# Patient Record
Sex: FEMALE | Race: BLACK OR AFRICAN AMERICAN | NOT HISPANIC OR LATINO | Employment: UNEMPLOYED | URBAN - METROPOLITAN AREA
[De-identification: names, ages, dates, MRNs, and addresses within clinical notes are randomized per-mention and may not be internally consistent; named-entity substitution may affect disease eponyms.]

---

## 2017-07-13 ENCOUNTER — HOSPITAL ENCOUNTER (EMERGENCY)
Facility: OTHER | Age: 32
Discharge: HOME | End: 2017-07-13
Attending: EMERGENCY MEDICINE

## 2017-07-13 VITALS
RESPIRATION RATE: 18 BRPM | OXYGEN SATURATION: 100 % | BODY MASS INDEX: 38.98 KG/M2 | SYSTOLIC BLOOD PRESSURE: 159 MMHG | DIASTOLIC BLOOD PRESSURE: 96 MMHG | HEART RATE: 74 BPM | TEMPERATURE: 98 F | WEIGHT: 241.5 LBS

## 2017-07-13 DIAGNOSIS — N10 ACUTE PYELONEPHRITIS: Primary | ICD-10-CM

## 2017-07-13 LAB
ALBUMIN SERPL-MCNC: 3.5 G/DL (ref 3.3–5.5)
ALP SERPL-CCNC: 50 U/L (ref 42–141)
B-HCG UR QL: NEGATIVE
BILIRUB SERPL-MCNC: 0.7 MG/DL (ref 0.2–1.6)
BILIRUBIN, POC UA: NEGATIVE
BLOOD, POC UA: ABNORMAL
BUN SERPL-MCNC: 7 MG/DL (ref 7–22)
CALCIUM SERPL-MCNC: 9 MG/DL (ref 8–10.3)
CHLORIDE SERPL-SCNC: 101 MMOL/L (ref 98–108)
CLARITY, POC UA: ABNORMAL
COLOR, POC UA: ABNORMAL
CREAT SERPL-MCNC: 0.9 MG/DL (ref 0.6–1.2)
CTP QC/QA: YES
GLUCOSE SERPL-MCNC: 88 MG/DL (ref 73–118)
GLUCOSE, POC UA: NEGATIVE
KETONES, POC UA: NEGATIVE
LEUKOCYTE EST, POC UA: ABNORMAL
NITRITE, POC UA: NEGATIVE
PH UR STRIP: 7 [PH]
POC ALT (SGPT): 9 U/L (ref 10–47)
POC AST (SGOT): 19 U/L (ref 11–38)
POC TCO2: 28 MMOL/L (ref 18–33)
POTASSIUM BLD-SCNC: 3.7 MMOL/L (ref 3.6–5.1)
PROTEIN, POC UA: ABNORMAL
PROTEIN, POC: 7.1 G/DL (ref 6.4–8.1)
SODIUM BLD-SCNC: 137 MMOL/L (ref 128–145)
SPECIFIC GRAVITY, POC UA: 1.02
UROBILINOGEN, POC UA: 0.2 E.U./DL

## 2017-07-13 PROCEDURE — 87077 CULTURE AEROBIC IDENTIFY: CPT

## 2017-07-13 PROCEDURE — 96372 THER/PROPH/DIAG INJ SC/IM: CPT

## 2017-07-13 PROCEDURE — 81025 URINE PREGNANCY TEST: CPT

## 2017-07-13 PROCEDURE — 81025 URINE PREGNANCY TEST: CPT | Performed by: EMERGENCY MEDICINE

## 2017-07-13 PROCEDURE — 87186 SC STD MICRODIL/AGAR DIL: CPT

## 2017-07-13 PROCEDURE — 99284 EMERGENCY DEPT VISIT MOD MDM: CPT | Mod: 25

## 2017-07-13 PROCEDURE — 96374 THER/PROPH/DIAG INJ IV PUSH: CPT

## 2017-07-13 PROCEDURE — 87088 URINE BACTERIA CULTURE: CPT

## 2017-07-13 PROCEDURE — 80053 COMPREHEN METABOLIC PANEL: CPT

## 2017-07-13 PROCEDURE — 63600175 PHARM REV CODE 636 W HCPCS: Performed by: EMERGENCY MEDICINE

## 2017-07-13 PROCEDURE — 85025 COMPLETE CBC W/AUTO DIFF WBC: CPT

## 2017-07-13 PROCEDURE — 87086 URINE CULTURE/COLONY COUNT: CPT

## 2017-07-13 RX ORDER — KETOROLAC TROMETHAMINE 30 MG/ML
30 INJECTION, SOLUTION INTRAMUSCULAR; INTRAVENOUS
Status: COMPLETED | OUTPATIENT
Start: 2017-07-13 | End: 2017-07-13

## 2017-07-13 RX ORDER — PHENAZOPYRIDINE HYDROCHLORIDE 200 MG/1
200 TABLET, FILM COATED ORAL 3 TIMES DAILY
Qty: 6 TABLET | Refills: 0 | Status: SHIPPED | OUTPATIENT
Start: 2017-07-13 | End: 2017-07-23

## 2017-07-13 RX ORDER — CIPROFLOXACIN 500 MG/1
500 TABLET ORAL 2 TIMES DAILY
Qty: 20 TABLET | Refills: 0 | Status: SHIPPED | OUTPATIENT
Start: 2017-07-13 | End: 2017-07-27

## 2017-07-13 RX ORDER — IBUPROFEN 800 MG/1
800 TABLET ORAL EVERY 6 HOURS PRN
Qty: 20 TABLET | Refills: 0 | Status: SHIPPED | OUTPATIENT
Start: 2017-07-13 | End: 2018-02-02

## 2017-07-13 RX ORDER — CEFTRIAXONE 1 G/1
1 INJECTION, POWDER, FOR SOLUTION INTRAMUSCULAR; INTRAVENOUS
Status: COMPLETED | OUTPATIENT
Start: 2017-07-13 | End: 2017-07-13

## 2017-07-13 RX ADMIN — KETOROLAC TROMETHAMINE 30 MG: 30 INJECTION, SOLUTION INTRAMUSCULAR at 03:07

## 2017-07-13 RX ADMIN — CEFTRIAXONE SODIUM 1 G: 1 INJECTION, POWDER, FOR SOLUTION INTRAMUSCULAR; INTRAVENOUS at 03:07

## 2017-07-13 NOTE — ED PROVIDER NOTES
Encounter Date: 2017       History     Chief Complaint   Patient presents with    Flank Pain     pain for 3 days, worse tonight     31 y.o. Female with past medical history of diabetes, hypertension presents to the emergency department complaining of acute left flank pain that began 3 days ago.  She reports associated dysuria, gross hematuria once yesterday and urinary frequency since that time.  She states she's been taking ibuprofen with minimal relief.  She states pain is worse with movement and is somewhat improved when she holds on to the area.  She reports similar symptoms when she was diagnosed with a urinary tract infection the past.          Review of patient's allergies indicates:  No Known Allergies  No past medical history on file.  Past Surgical History:   Procedure Laterality Date     SECTION       Family History   Problem Relation Age of Onset    Hypertension Mother     Diabetes Mother     Heart disease Mother     Hypertension Father     Hypertension Sister     Diabetes Brother      Social History   Substance Use Topics    Smoking status: Never Smoker    Smokeless tobacco: Not on file    Alcohol use Yes      Comment: socially     Review of Systems   Constitutional: Positive for fever.   Respiratory: Negative.    Cardiovascular: Negative.    Gastrointestinal: Positive for nausea and vomiting. Negative for abdominal pain, constipation and diarrhea.   Genitourinary: Positive for dysuria, flank pain, frequency and hematuria. Negative for decreased urine volume, difficulty urinating, genital sores, pelvic pain and vaginal discharge.   Musculoskeletal: Positive for back pain. Negative for gait problem.   Neurological: Negative for dizziness and headaches.   All other systems reviewed and are negative.      Physical Exam     Initial Vitals [17 0033]   BP Pulse Resp Temp SpO2   (!) 174/117 74 18 98.3 °F (36.8 °C) 100 %      MAP       136         Physical Exam    Nursing note and  vitals reviewed.  Constitutional: She appears well-developed and well-nourished. She is not diaphoretic. No distress.   HENT:   Head: Normocephalic and atraumatic.   Mouth/Throat: Oropharynx is clear and moist. No oropharyngeal exudate.   Eyes: EOM are normal. Pupils are equal, round, and reactive to light.   Neck: Normal range of motion. Neck supple.   Cardiovascular: Normal rate, regular rhythm and intact distal pulses.   No murmur heard.  Pulmonary/Chest: Breath sounds normal. No stridor. No respiratory distress.   Abdominal: Soft. Bowel sounds are normal. There is no tenderness. There is CVA tenderness (left). There is no rigidity, no rebound, no guarding, no tenderness at McBurney's point and negative Gould's sign.   Musculoskeletal: Normal range of motion. She exhibits no edema or tenderness.   Neurological: She is alert and oriented to person, place, and time. She has normal strength. No cranial nerve deficit.   Skin: Skin is warm and dry. Capillary refill takes less than 2 seconds. No erythema. No pallor.   Psychiatric: She has a normal mood and affect.         ED Course   Procedures  Labs Reviewed   POCT URINALYSIS W/O SCOPE - Abnormal; Notable for the following:        Result Value    Glucose, UA Negative (*)     Bilirubin, UA Negative (*)     Ketones, UA Negative (*)     Blood, UA 2+ (*)     Protein, UA 1+ (*)     Nitrite, UA Negative (*)     Leukocytes, UA 1+ (*)     All other components within normal limits   POCT CMP - Abnormal; Notable for the following:     ALT (SGPT), POC 9 (*)     POC BUN 7 (*)     All other components within normal limits   CULTURE, URINE   POCT URINE PREGNANCY   POCT URINALYSIS W/O SCOPE   POCT CBC   POCT CMP                               ED Course     Labs Reviewed  Admission on 07/13/2017, Discharged on 07/13/2017   Component Date Value Ref Range Status    POC Preg Test, Ur 07/13/2017 Negative  Negative Final     Acceptable 07/13/2017 Yes   Final    Glucose,  UA 07/13/2017 Negative*  Final    Bilirubin, UA 07/13/2017 Negative*  Final    Ketones, UA 07/13/2017 Negative*  Final    Spec Grav UA 07/13/2017 1.020   Final    Blood, UA 07/13/2017 2+*  Final    PH, UA 07/13/2017 7.0   Final    Protein, UA 07/13/2017 1+*  Final    Urobilinogen, UA 07/13/2017 0.2  E.U./dL Final    Nitrite, UA 07/13/2017 Negative*  Final    Leukocytes, UA 07/13/2017 1+*  Final    Color, UA 07/13/2017 Light yellow   Final    Clarity, UA 07/13/2017 Cloudy   Final    Albumin, POC 07/13/2017 3.5  3.3 - 5.5 g/dL Final    Alkaline Phosphatase, POC 07/13/2017 50  42 - 141 U/L Final    ALT (SGPT), POC 07/13/2017 9* 10 - 47 U/L Final    AST (SGOT), POC 07/13/2017 19  11 - 38 U/L Final    POC BUN 07/13/2017 7* 7 - 22 mg/dL Final    Calcium, POC 07/13/2017 9.0  8.0 - 10.3 mg/dL Final    POC Chloride 07/13/2017 101  98 - 108 mmol/L Final    POC Creatinine 07/13/2017 0.9  0.6 - 1.2 mg/dL Final    POC Glucose 07/13/2017 88  73 - 118 mg/dL Final    POC Potassium 07/13/2017 3.7  3.6 - 5.1 mmol/L Final    POC Sodium 07/13/2017 137  128 - 145 mmol/L Final    Bilirubin 07/13/2017 0.7  0.2 - 1.6 mg/dL Final    POC TCO2 07/13/2017 28  18 - 33 mmol/L Final    Protein 07/13/2017 7.1  6.4 - 8.1 g/dL Final    Urine Culture, Routine 07/15/2017    Final                    Value:ESCHERICHIA COLI  >100,000 cfu/ml           Medications given in ED    Medications   cefTRIAXone injection 1 g (1 g Intramuscular Given 7/13/17 6303)   ketorolac injection 30 mg (30 mg Intravenous Given 7/13/17 5723)     Discharge Medications     Discharge Medication List as of 7/13/2017  3:54 AM      START taking these medications    Details   ciprofloxacin HCl (CIPRO) 500 MG tablet Take 1 tablet (500 mg total) by mouth 2 (two) times daily., Starting Thu 7/13/2017, Until Thu 7/27/2017, Normal      ibuprofen (ADVIL,MOTRIN) 800 MG tablet Take 1 tablet (800 mg total) by mouth every 6 (six) hours as needed for Pain., Starting  Thu 7/13/2017, Normal      phenazopyridine (PYRIDIUM) 200 MG tablet Take 1 tablet (200 mg total) by mouth 3 (three) times daily., Starting Thu 7/13/2017, Until Sun 7/23/2017, Normal         CONTINUE these medications which have NOT CHANGED    Details   amlodipine (NORVASC) 5 MG tablet Take 1 tablet (5 mg total) by mouth once daily., Starting 10/31/2016, Until Tue 10/31/17, Normal      hydrochlorothiazide (HYDRODIURIL) 12.5 MG Tab Take 1 tablet (12.5 mg total) by mouth once daily., Starting 10/31/2016, Until Tue 10/31/17, Normal      multivitamin capsule Take 1 capsule by mouth once daily., Until Discontinued, Historical Med                   Patient discharged to home in stable condition with instructions to:   1. Please take all meds as prescribed.  2. Follow-up with your primary care doctor   3. Return precautions discussed and patient and/or family/caretaker understands to return to the emergency room for any concerns including worsening of your current symptoms, fever, chills, night sweats, worsening pain, chest pain, shortness of breath, nausea, vomiting, diarrhea, bleeding, headache, difficulty talking, visual disturbances, weakness, numbness or any other acute concerns      Clinical Impression:   The encounter diagnosis was Acute pyelonephritis.                           Yamile Hurt MD  07/28/17 2945

## 2017-07-15 LAB — BACTERIA UR CULT: NORMAL

## 2018-02-02 ENCOUNTER — OFFICE VISIT (OUTPATIENT)
Dept: FAMILY MEDICINE | Facility: CLINIC | Age: 33
End: 2018-02-02

## 2018-02-02 VITALS
SYSTOLIC BLOOD PRESSURE: 126 MMHG | OXYGEN SATURATION: 99 % | DIASTOLIC BLOOD PRESSURE: 84 MMHG | WEIGHT: 217.5 LBS | TEMPERATURE: 98 F | HEIGHT: 67 IN | HEART RATE: 100 BPM | BODY MASS INDEX: 34.14 KG/M2

## 2018-02-02 DIAGNOSIS — Z63.4 BEREAVEMENT: ICD-10-CM

## 2018-02-02 DIAGNOSIS — E66.9 OBESITY, CLASS I, BMI 30-34.9: ICD-10-CM

## 2018-02-02 DIAGNOSIS — Z00.00 ROUTINE MEDICAL EXAM: Primary | ICD-10-CM

## 2018-02-02 DIAGNOSIS — F51.04 PSYCHOPHYSIOLOGICAL INSOMNIA: ICD-10-CM

## 2018-02-02 PROCEDURE — 99213 OFFICE O/P EST LOW 20 MIN: CPT | Mod: PBBFAC,PO | Performed by: INTERNAL MEDICINE

## 2018-02-02 PROCEDURE — 99395 PREV VISIT EST AGE 18-39: CPT | Mod: S$PBB,,, | Performed by: INTERNAL MEDICINE

## 2018-02-02 PROCEDURE — 99999 PR PBB SHADOW E&M-EST. PATIENT-LVL III: CPT | Mod: PBBFAC,,, | Performed by: INTERNAL MEDICINE

## 2018-02-02 SDOH — SOCIAL DETERMINANTS OF HEALTH (SDOH): DISSAPEARANCE AND DEATH OF FAMILY MEMBER: Z63.4

## 2018-02-02 NOTE — PROGRESS NOTES
Assessment & Plan (all problems are new to me)  Problem List Items Addressed This Visit        Endocrine    Obesity, Class I, BMI 30-34.9    Current Assessment & Plan     The patient is asked to make an attempt to improve diet and exercise patterns to aid in medical management of this problem.            Other Visit Diagnoses     Routine medical exam    -  Primary  -    Discussed healthy diet, regular exercise, necessary labs, age appropriate cancer screening, and routine vaccinations.       Psychophysiological insomnia    -  Counseled on sleep hygiene and coping mechanisms to help with sleep    Bereavement    -  As above.  Doing very well overall.             Health Maintenance reviewed, counseled on locations where she can receive a free or low cost Pap an Flu vaccination.    Follow-up: Follow-up in about 1 year (around 2019) for Routine Physical.  ______________________________________________________________________    Chief Complaint  Chief Complaint   Patient presents with    Establish Care     new to pcp       HPI  Jasmyn Garcia is a 32 y.o. female with multiple medical diagnoses as listed in the medical history and problem list that presents for establish care.  Pt is new to me but is known to this clinic with her last appointment being 10/2016 at [facility].      She reports that she has lost about 30# with diet and exercise.  She lost took antihypertensives >6 months ago.        PAST MEDICAL HISTORY:  Past Medical History:   Diagnosis Date    Hypertension     resolved with weight loss       PAST SURGICAL HISTORY:  Past Surgical History:   Procedure Laterality Date     SECTION         SOCIAL HISTORY:  Social History     Social History    Marital status:      Spouse name: N/A    Number of children: N/A    Years of education: N/A     Occupational History    Not on file.     Social History Main Topics    Smoking status: Never Smoker    Smokeless tobacco: Never Used     Alcohol use Yes      Comment: occasional    Drug use: No    Sexual activity: Not Currently     Other Topics Concern    Not on file     Social History Narrative    No narrative on file       FAMILY HISTORY:  Family History   Problem Relation Age of Onset    Hypertension Mother     Diabetes Mother     Heart disease Mother 32     MI then CAD    Hypertension Father     Hypertension Sister     Diabetes Brother        ALLERGIES AND MEDICATIONS: updated and reviewed.  Review of patient's allergies indicates:  No Known Allergies  Current Outpatient Prescriptions   Medication Sig Dispense Refill    multivitamin capsule Take 1 capsule by mouth once daily.       No current facility-administered medications for this visit.          ROS  Review of Systems   Constitutional: Negative for chills, diaphoresis, fever and unexpected weight change.   HENT: Negative for congestion, dental problem, ear discharge, ear pain, hearing loss, postnasal drip, rhinorrhea, sinus pressure, sore throat and trouble swallowing.    Eyes: Negative for pain, discharge, redness, itching and visual disturbance.   Respiratory: Negative for cough, chest tightness, shortness of breath and wheezing.    Cardiovascular: Negative for chest pain, palpitations and leg swelling.   Gastrointestinal: Negative for abdominal distention, abdominal pain, blood in stool, constipation, diarrhea, nausea and vomiting.        No melena   Endocrine: Negative for cold intolerance, heat intolerance, polydipsia, polyphagia and polyuria.        No nocturia   Genitourinary: Negative for decreased urine volume, difficulty urinating, dysuria, flank pain, frequency, genital sores, hematuria, menstrual problem, pelvic pain, urgency, vaginal bleeding, vaginal discharge and vaginal pain.   Musculoskeletal: Negative for arthralgias, joint swelling, myalgias, neck pain and neck stiffness.   Skin: Negative for color change, pallor and rash.   Neurological: Negative for  "dizziness, tremors, seizures, syncope, weakness, light-headedness and headaches.   Hematological: Negative for adenopathy. Does not bruise/bleed easily.   Psychiatric/Behavioral: Positive for sleep disturbance. Negative for confusion, decreased concentration and suicidal ideas. The patient is not nervous/anxious.         No depression         Physical Exam  Vitals:    02/02/18 0955   BP: 126/84   Pulse: 100   Temp: 98.3 °F (36.8 °C)   SpO2: 99%   Weight: 98.6 kg (217 lb 7.7 oz)   Height: 5' 7" (1.702 m)    Body mass index is 34.06 kg/m².  Weight: 98.6 kg (217 lb 7.7 oz)   Height: 5' 7" (170.2 cm)   Physical Exam   Constitutional: She is oriented to person, place, and time. She appears well-developed and well-nourished. No distress.   HENT:   Head: Normocephalic and atraumatic.   Right Ear: Tympanic membrane, external ear and ear canal normal.   Left Ear: Tympanic membrane, external ear and ear canal normal.   Nose: Nose normal. Right sinus exhibits no maxillary sinus tenderness and no frontal sinus tenderness. Left sinus exhibits no maxillary sinus tenderness and no frontal sinus tenderness.   Mouth/Throat: Uvula is midline, oropharynx is clear and moist and mucous membranes are normal. No tonsillar exudate.   Eyes: Conjunctivae, EOM and lids are normal. Pupils are equal, round, and reactive to light. No scleral icterus.   Neck: Full passive range of motion without pain. Neck supple. No JVD present. No spinous process tenderness and no muscular tenderness present. Carotid bruit is not present. No thyromegaly present.   Cardiovascular: Normal rate, regular rhythm, S1 normal, S2 normal and intact distal pulses.  Exam reveals no S3, no S4 and no friction rub.    No murmur heard.  Pulmonary/Chest: Effort normal and breath sounds normal. She has no wheezes. She has no rhonchi. She has no rales.   Abdominal: Soft. Bowel sounds are normal. She exhibits no distension. There is no hepatosplenomegaly. There is no tenderness. " There is no rebound and no CVA tenderness.   Musculoskeletal: Normal range of motion. She exhibits no edema or tenderness.   Lymphadenopathy:        Head (right side): No submental and no submandibular adenopathy present.        Head (left side): No submental and no submandibular adenopathy present.     She has no cervical adenopathy.   Neurological: She is alert and oriented to person, place, and time. Coordination normal.   Motor grossly intact.  Sensory grossly intact.  Symmetric facial movements palate elevated symmetrically tongue midline     Skin: Skin is warm and dry. No rash noted. No cyanosis. Nails show no clubbing.   Psychiatric: She has a normal mood and affect. Her speech is normal and behavior is normal. Thought content normal. Cognition and memory are normal.           Health Maintenance       Date Due Completion Date    TETANUS VACCINE 08/23/2001 ---    Pap Smear with HPV Cotest 08/23/2004 ---    Influenza Vaccine 08/01/2017 ---

## 2018-02-02 NOTE — PATIENT INSTRUCTIONS
Below is a list of free Apps that you may find helpful (some of them may not apply to you since this is a general list of helpful Apps):    Android & Apple users:  Fooducate - Helps with healthy diet and weight loss  Shop Well - Scan barcodes to foods to see if it is healthy or not.  It will also suggest healthier alternatives  Lose It - Calorie tracking and goal setting for weight loss.  Peer support is also available  Calorie Counter and Diet Tracker by MyFitnessPal - Helps count calories for food intake and calories burned during exercise  Popsugar Active - has pictures and videos of preloaded workout routines  [facility] Diabetes Pal - log for home sugars, diet, weight, and blood pressure  Headspace - Guides your through meditation.  The first 10 programs are free.

## 2018-04-01 ENCOUNTER — CLINICAL SUPPORT (OUTPATIENT)
Dept: OCCUPATIONAL MEDICINE | Facility: CLINIC | Age: 33
End: 2018-04-01

## 2018-06-18 NOTE — PROGRESS NOTES
Subjective:       Patient ID: Jasmyn Gacria is a 32 y.o. female.    Vitals:  vitals were not taken for this visit.    Chief Complaint: No chief complaint on file.    Urine collected      ROS    Objective:      Physical Exam    Assessment:       No diagnosis found.    Plan:         There are no diagnoses linked to this encounter.

## 2018-07-09 ENCOUNTER — HOSPITAL ENCOUNTER (EMERGENCY)
Facility: HOSPITAL | Age: 33
Discharge: HOME | End: 2018-07-09
Attending: EMERGENCY MEDICINE

## 2018-07-09 VITALS
BODY MASS INDEX: 31.5 KG/M2 | HEART RATE: 66 BPM | TEMPERATURE: 99 F | OXYGEN SATURATION: 99 % | WEIGHT: 196 LBS | SYSTOLIC BLOOD PRESSURE: 131 MMHG | DIASTOLIC BLOOD PRESSURE: 83 MMHG | HEIGHT: 66 IN | RESPIRATION RATE: 16 BRPM

## 2018-07-09 DIAGNOSIS — R07.9 CHEST PAIN: ICD-10-CM

## 2018-07-09 DIAGNOSIS — M62.838 TRAPEZIUS MUSCLE SPASM: Primary | ICD-10-CM

## 2018-07-09 LAB
ALBUMIN SERPL BCP-MCNC: 3.8 G/DL
ALP SERPL-CCNC: 63 U/L
ALT SERPL W/O P-5'-P-CCNC: 7 U/L
ANION GAP SERPL CALC-SCNC: 8 MMOL/L
AST SERPL-CCNC: 14 U/L
BASOPHILS # BLD AUTO: 0.01 K/UL
BASOPHILS NFR BLD: 0.3 %
BILIRUB SERPL-MCNC: 1.1 MG/DL
BNP SERPL-MCNC: 34 PG/ML
BUN SERPL-MCNC: 6 MG/DL
CALCIUM SERPL-MCNC: 9.4 MG/DL
CHLORIDE SERPL-SCNC: 110 MMOL/L
CO2 SERPL-SCNC: 25 MMOL/L
CREAT SERPL-MCNC: 0.9 MG/DL
DIFFERENTIAL METHOD: ABNORMAL
EOSINOPHIL # BLD AUTO: 0.1 K/UL
EOSINOPHIL NFR BLD: 2.3 %
ERYTHROCYTE [DISTWIDTH] IN BLOOD BY AUTOMATED COUNT: 12.8 %
EST. GFR  (AFRICAN AMERICAN): >60 ML/MIN/1.73 M^2
EST. GFR  (NON AFRICAN AMERICAN): >60 ML/MIN/1.73 M^2
GLUCOSE SERPL-MCNC: 85 MG/DL
HCT VFR BLD AUTO: 36.3 %
HGB BLD-MCNC: 12.4 G/DL
LYMPHOCYTES # BLD AUTO: 1.5 K/UL
LYMPHOCYTES NFR BLD: 39.7 %
MCH RBC QN AUTO: 28.8 PG
MCHC RBC AUTO-ENTMCNC: 34.2 G/DL
MCV RBC AUTO: 84 FL
MONOCYTES # BLD AUTO: 0.3 K/UL
MONOCYTES NFR BLD: 6.8 %
NEUTROPHILS # BLD AUTO: 2 K/UL
NEUTROPHILS NFR BLD: 50.6 %
PLATELET # BLD AUTO: 219 K/UL
PMV BLD AUTO: 9.6 FL
POTASSIUM SERPL-SCNC: 3.7 MMOL/L
PROT SERPL-MCNC: 7.3 G/DL
RBC # BLD AUTO: 4.3 M/UL
SODIUM SERPL-SCNC: 143 MMOL/L
TROPONIN I SERPL DL<=0.01 NG/ML-MCNC: <0.006 NG/ML
WBC # BLD AUTO: 3.85 K/UL

## 2018-07-09 PROCEDURE — 93010 ELECTROCARDIOGRAM REPORT: CPT | Mod: 76,,, | Performed by: INTERNAL MEDICINE

## 2018-07-09 PROCEDURE — 93005 ELECTROCARDIOGRAM TRACING: CPT

## 2018-07-09 PROCEDURE — 93010 ELECTROCARDIOGRAM REPORT: CPT | Mod: ,,, | Performed by: INTERNAL MEDICINE

## 2018-07-09 PROCEDURE — 85025 COMPLETE CBC W/AUTO DIFF WBC: CPT

## 2018-07-09 PROCEDURE — 80053 COMPREHEN METABOLIC PANEL: CPT

## 2018-07-09 PROCEDURE — 83880 ASSAY OF NATRIURETIC PEPTIDE: CPT

## 2018-07-09 PROCEDURE — 84484 ASSAY OF TROPONIN QUANT: CPT

## 2018-07-09 PROCEDURE — 99284 EMERGENCY DEPT VISIT MOD MDM: CPT | Mod: 25

## 2018-07-09 PROCEDURE — 25000003 PHARM REV CODE 250: Performed by: EMERGENCY MEDICINE

## 2018-07-09 RX ORDER — IBUPROFEN 600 MG/1
600 TABLET ORAL EVERY 6 HOURS PRN
Qty: 20 TABLET | Refills: 0 | Status: SHIPPED | OUTPATIENT
Start: 2018-07-09 | End: 2019-02-13

## 2018-07-09 RX ORDER — CYCLOBENZAPRINE HCL 5 MG
5 TABLET ORAL 3 TIMES DAILY PRN
Qty: 15 TABLET | Refills: 0 | Status: SHIPPED | OUTPATIENT
Start: 2018-07-09 | End: 2018-07-19

## 2018-07-09 RX ORDER — ASPIRIN 325 MG
325 TABLET ORAL
Status: COMPLETED | OUTPATIENT
Start: 2018-07-09 | End: 2018-07-09

## 2018-07-09 RX ORDER — DIAZEPAM 5 MG/1
5 TABLET ORAL
Status: COMPLETED | OUTPATIENT
Start: 2018-07-09 | End: 2018-07-09

## 2018-07-09 RX ADMIN — ASPIRIN 325 MG ORAL TABLET 325 MG: 325 PILL ORAL at 07:07

## 2018-07-09 RX ADMIN — DIAZEPAM 5 MG: 5 TABLET ORAL at 07:07

## 2018-07-09 NOTE — ED PROVIDER NOTES
Encounter Date: 2018    SCRIBE #1 NOTE: I, Ion Brink, am scribing for, and in the presence of,  Ashleigh Reyes MD. I have scribed the following portions of the note - Other sections scribed: HPI and ROS.       History     Chief Complaint   Patient presents with    Chest Pain     States she has intermittent chest tightness and right arm tingling that started 2 hours ago     Chief Complaint: Chest Pain    HPI: This 32 y.o. Female with HTN presents to the ED c/o acute onset chest pain. Symptoms began at 3 pm today. Pain was severe and described as a squeezing sensation. Chest pain was constant but has since improved. There was associated SOB(since resolved), intermittent dizziness, nausea(since resolved) and right arm numbness/tingling. She also reports fatigue, posterior neck pain, and right jaw pain. No attempted treatment. Patient denies fever, chills, diaphoresis, headache, vomiting, abdominal pain, diarrhea, vision changes, or leg swelling.       The history is provided by the patient. No  was used.     Review of patient's allergies indicates:  No Known Allergies  Past Medical History:   Diagnosis Date    Hypertension     resolved with weight loss     Past Surgical History:   Procedure Laterality Date     SECTION       Family History   Problem Relation Age of Onset    Hypertension Mother     Diabetes Mother     Heart disease Mother 32        MI then CAD    Hypertension Father     Hypertension Sister     Diabetes Brother      Social History   Substance Use Topics    Smoking status: Former Smoker     Packs/day: 0.50     Types: Cigars    Smokeless tobacco: Never Used    Alcohol use Yes      Comment: occasional     Review of Systems   Constitutional: Positive for fatigue. Negative for chills and fever.   HENT: Negative for ear pain and sore throat.    Eyes: Negative for pain and visual disturbance.   Respiratory: Negative for cough and shortness of breath.     Cardiovascular: Positive for chest pain.   Gastrointestinal: Negative for abdominal pain, diarrhea, nausea and vomiting.   Genitourinary: Negative for dysuria and hematuria.   Musculoskeletal: Positive for neck pain. Negative for myalgias (arm or leg pain).   Skin: Negative for rash.   Neurological: Positive for dizziness and numbness. Negative for weakness and headaches.   Psychiatric/Behavioral: The patient is nervous/anxious.        Physical Exam     Initial Vitals [07/09/18 1827]   BP Pulse Resp Temp SpO2   (!) 168/100 65 16 99 °F (37.2 °C) 100 %      MAP       --         Physical Exam    Nursing note and vitals reviewed.  Constitutional: She appears well-developed and well-nourished. She is not diaphoretic. No distress.   HENT:   Head: Normocephalic and atraumatic.   Mouth/Throat: Oropharynx is clear and moist.   Eyes: Conjunctivae and EOM are normal. Pupils are equal, round, and reactive to light. No scleral icterus.   Neck: Normal range of motion. Neck supple. No JVD present.   Cardiovascular: Normal rate and regular rhythm.   Pulmonary/Chest: Breath sounds normal. No respiratory distress. She has no wheezes.   Abdominal: Soft. Bowel sounds are normal. She exhibits no distension. There is no tenderness.   Musculoskeletal: Normal range of motion. She exhibits no edema.   Right trapezius spasm and tenderness  No midline vertebral tenderness, no stepoffs   Neurological: She is alert and oriented to person, place, and time. No cranial nerve deficit or sensory deficit.   Skin: Skin is warm and dry.   Psychiatric: Her behavior is normal. Her mood appears anxious.   Anxious appearing         ED Course   Procedures  Labs Reviewed   CBC W/ AUTO DIFFERENTIAL - Abnormal; Notable for the following:        Result Value    WBC 3.85 (*)     Hematocrit 36.3 (*)     All other components within normal limits   COMPREHENSIVE METABOLIC PANEL - Abnormal; Notable for the following:     Total Bilirubin 1.1 (*)     ALT 7 (*)      All other components within normal limits   TROPONIN I   B-TYPE NATRIURETIC PEPTIDE   TROPONIN I     EKG Readings: (Independently Interpreted)   Initial Reading: No STEMI. Previous EKG: Compared with most recent EKG Previous EKG Date: 11/3/16. Rhythm: Normal Sinus Rhythm. Heart Rate: 66. Ectopy: No Ectopy. Conduction: Normal. ST Segments: Normal ST Segments. T Waves Flipped: III, V3 and V4.       Imaging Results          X-Ray Chest PA And Lateral (Final result)  Result time 07/09/18 18:59:24    Final result by Jakob La MD (07/09/18 18:59:24)                 Impression:      Normal radiographic appearance of the chest.      Electronically signed by: Jakob La MD  Date:    07/09/2018  Time:    18:59             Narrative:    EXAMINATION:  XR CHEST PA AND LATERAL    CLINICAL HISTORY:  Chest pain, unspecified    TECHNIQUE:  PA and lateral views of the chest were performed.    COMPARISON:  None    FINDINGS:  The lungs are clear, with normal appearance of pulmonary vasculature and no pleural effusion or pneumothorax.    The cardiac silhouette is normal in size. The hilar and mediastinal contours are unremarkable.    Bones are intact.                              X-Rays:   Independently Interpreted Readings:   Chest X-Ray: No infiltrates.  No acute abnormalities.  Normal heart size.     Medical Decision Making:   History:   Old Medical Records: I decided to obtain old medical records.  Differential Diagnosis:   Anxiety  Musculoskeletal pain  ACS  Independently Interpreted Test(s):   I have ordered and independently interpreted X-rays - see prior notes.  I have ordered and independently interpreted EKG Reading(s) - see prior notes  Clinical Tests:   Lab Tests: Ordered and Reviewed  Radiological Study: Ordered and Reviewed  Medical Tests: Ordered and Reviewed  ED Management:  Patient afebrile, no acute distress at time history and physical.  She appears anxious.  EKG without definite acute ischemic changes.   Physical exam is notable for the right trapezius spasm.  Patient has no focal neurological deficits chest x-ray to without pneumonia.  Lab sent to evaluate for ACS.  Patient given p.o. Valium in the emergency room to treat for possible anxiety as well as muscle spasm.  The patient reports some improvement in her symptoms after Valium.  Labs within acceptable limits with negative troponin.  Her heart course to indicating a less than 2% chance of adverse cardiac event.  Patient has remained hemodynamically stable in the emergency room is fit for discharge. Given prescription for Flexeril and ibuprofen for musculoskeletal etiology of her pain. Patient reports having a psychiatrist that the that she sees for her grief adjustment after her  that.  Patient counseled to establish a follow-up appointment with her psychiatrist.  Discussed extensively with patient and her sister concerning symptoms for which to return to the emergency room as well as the importance of close follow up with her primary physician and her psychiatrist.  They both expressed understanding in agreement with treatment plan.    Additional MDM:   PERC Rule:   Age is greater than or equal to 50 = 0.0  Heart Rate is greater than or equal to 100 = 0.0  SaO2 on room air < 95% = 0.0  Unilateral leg swelling = 0.0  Hemoptysis = 0.0  Recent surgery or trauma = 0.0  Prior PE or DVT =  0.0  Hormone use = 0.00  PERC Score = 0  Heart Score:    History:          Slightly suspicious.  ECG:             Nonspecific repolarisation disturbance  Age:               Less than 45 years  Risk factors: 1-2 risk factors  Troponin:       Less than or equal to normal limit  Final Score: 2             Scribe Attestation:   Scribe #1: I performed the above scribed service and the documentation accurately describes the services I performed. I attest to the accuracy of the note.    Attending Attestation:           Physician Attestation for Scribe:  Physician Attestation  Statement for Scribe #1: I, Ashleigh Reyes MD, reviewed documentation, as scribed by Ion Brink in my presence, and it is both accurate and complete.                    Clinical Impression:   Diagnoses of Chest pain and Chest pain were pertinent to this visit.      Disposition:   Disposition: Discharged  Condition: Stable                        Ann Darby MD  07/09/18 2022

## 2018-07-10 NOTE — DISCHARGE INSTRUCTIONS
Use ibuprofen as needed for pain and Flexeril as needed for muscle spasm.  Use caution when taking these medications as ibuprofen can cause upset stomach if not taken with food and Flexeril can cause you to feel drowsy.  Make an appointment to see your primary physician and your psychiatrist as soon as possible to monitor your symptoms. The return to the emergency room for any new, worsening or concerning symptoms.

## 2018-10-23 ENCOUNTER — PATIENT MESSAGE (OUTPATIENT)
Dept: FAMILY MEDICINE | Facility: CLINIC | Age: 33
End: 2018-10-23

## 2019-01-15 ENCOUNTER — PATIENT OUTREACH (OUTPATIENT)
Dept: ADMINISTRATIVE | Facility: HOSPITAL | Age: 34
End: 2019-01-15

## 2019-01-29 ENCOUNTER — OFFICE VISIT (OUTPATIENT)
Dept: FAMILY MEDICINE | Facility: CLINIC | Age: 34
End: 2019-01-29
Payer: COMMERCIAL

## 2019-01-29 VITALS
BODY MASS INDEX: 31.38 KG/M2 | DIASTOLIC BLOOD PRESSURE: 86 MMHG | WEIGHT: 199.94 LBS | SYSTOLIC BLOOD PRESSURE: 126 MMHG | TEMPERATURE: 98 F | HEART RATE: 97 BPM | OXYGEN SATURATION: 97 % | HEIGHT: 67 IN

## 2019-01-29 DIAGNOSIS — R09.82 POST-NASAL DRIP: Primary | ICD-10-CM

## 2019-01-29 DIAGNOSIS — R03.0 ELEVATED BLOOD PRESSURE READING WITHOUT DIAGNOSIS OF HYPERTENSION: ICD-10-CM

## 2019-01-29 DIAGNOSIS — Z23 FLU VACCINE NEED: ICD-10-CM

## 2019-01-29 DIAGNOSIS — Z00.00 ROUTINE MEDICAL EXAM: ICD-10-CM

## 2019-01-29 DIAGNOSIS — R49.0 VOICE HOARSENESSES: ICD-10-CM

## 2019-01-29 PROCEDURE — 99214 PR OFFICE/OUTPT VISIT, EST, LEVL IV, 30-39 MIN: ICD-10-PCS | Mod: 25,S$GLB,, | Performed by: INTERNAL MEDICINE

## 2019-01-29 PROCEDURE — 3008F BODY MASS INDEX DOCD: CPT | Mod: CPTII,S$GLB,, | Performed by: INTERNAL MEDICINE

## 2019-01-29 PROCEDURE — 90686 IIV4 VACC NO PRSV 0.5 ML IM: CPT | Mod: S$GLB,,, | Performed by: INTERNAL MEDICINE

## 2019-01-29 PROCEDURE — 3079F PR MOST RECENT DIASTOLIC BLOOD PRESSURE 80-89 MM HG: ICD-10-PCS | Mod: CPTII,S$GLB,, | Performed by: INTERNAL MEDICINE

## 2019-01-29 PROCEDURE — 90471 IMMUNIZATION ADMIN: CPT | Mod: S$GLB,,, | Performed by: INTERNAL MEDICINE

## 2019-01-29 PROCEDURE — 99999 PR PBB SHADOW E&M-EST. PATIENT-LVL III: ICD-10-PCS | Mod: PBBFAC,,, | Performed by: INTERNAL MEDICINE

## 2019-01-29 PROCEDURE — 3079F DIAST BP 80-89 MM HG: CPT | Mod: CPTII,S$GLB,, | Performed by: INTERNAL MEDICINE

## 2019-01-29 PROCEDURE — 90686 FLU VACCINE (QUAD) GREATER THAN OR EQUAL TO 3YO PRESERVATIVE FREE IM: ICD-10-PCS | Mod: S$GLB,,, | Performed by: INTERNAL MEDICINE

## 2019-01-29 PROCEDURE — 99999 PR PBB SHADOW E&M-EST. PATIENT-LVL III: CPT | Mod: PBBFAC,,, | Performed by: INTERNAL MEDICINE

## 2019-01-29 PROCEDURE — 99214 OFFICE O/P EST MOD 30 MIN: CPT | Mod: 25,S$GLB,, | Performed by: INTERNAL MEDICINE

## 2019-01-29 PROCEDURE — 3074F SYST BP LT 130 MM HG: CPT | Mod: CPTII,S$GLB,, | Performed by: INTERNAL MEDICINE

## 2019-01-29 PROCEDURE — 90471 FLU VACCINE (QUAD) GREATER THAN OR EQUAL TO 3YO PRESERVATIVE FREE IM: ICD-10-PCS | Mod: S$GLB,,, | Performed by: INTERNAL MEDICINE

## 2019-01-29 PROCEDURE — 3074F PR MOST RECENT SYSTOLIC BLOOD PRESSURE < 130 MM HG: ICD-10-PCS | Mod: CPTII,S$GLB,, | Performed by: INTERNAL MEDICINE

## 2019-01-29 PROCEDURE — 3008F PR BODY MASS INDEX (BMI) DOCUMENTED: ICD-10-PCS | Mod: CPTII,S$GLB,, | Performed by: INTERNAL MEDICINE

## 2019-01-29 RX ORDER — FLUTICASONE PROPIONATE 50 MCG
1 SPRAY, SUSPENSION (ML) NASAL DAILY
Qty: 1 BOTTLE | Refills: 3 | Status: SHIPPED | OUTPATIENT
Start: 2019-01-29

## 2019-01-29 RX ORDER — CETIRIZINE HYDROCHLORIDE 10 MG/1
10 TABLET ORAL NIGHTLY
Qty: 90 TABLET | Refills: 1 | COMMUNITY
Start: 2019-01-29 | End: 2024-04-01

## 2019-01-29 NOTE — PATIENT INSTRUCTIONS
Use pre-bottled nasal saline at least once a day but as often as desired for comfort (if you are mixing your own solution, you MUST use either distilled water or boiled water that has been allowed to cool).  Blow your nose after you spray each nostril.  AFTER using the nasal saline, use the nasal steroid in the following manner:    Place the tip of the bottle into your nostril aiming towards the outside corner of each eye.  You may find it beneficial to use the opposite hand for the nostril that you are spraying. Do not aim the bottle straight up your nose. [place] the medicine but do not perform a hard sniff when you do.  If you can taste the medication, then you have sniffed too hard.  Dab any medication that drips out of your nose but do not blow your nose as this will expel the medication.

## 2019-01-29 NOTE — ASSESSMENT & PLAN NOTE
The patient is asked to make an attempt to improve diet and exercise patterns to aid in medical management of this problem. Patient is asked to monitor BP at home or work, several times per month and return with written values at next office visit.

## 2019-01-29 NOTE — PROGRESS NOTES
Assessment & Plan  Problem List Items Addressed This Visit        Cardiac/Vascular    Elevated blood pressure reading without diagnosis of hypertension (Chronic)    Current Assessment & Plan     The patient is asked to make an attempt to improve diet and exercise patterns to aid in medical management of this problem. Patient is asked to monitor BP at home or work, several times per month and return with written values at next office visit.             Other Visit Diagnoses     Post-nasal drip    -  Primary-  Start 2nd generation antihistamine and nasal steroid.  I have discussed the common side effects of this medication and black box warnings (if applicable to this medication) with the patient and answered all of the questions they had at the time of this visit regarding this medication. I taught the patient the correct technique for nasal spray use.      Relevant Medications    cetirizine (ZYRTEC) 10 MG tablet    fluticasone (FLONASE) 50 mcg/actuation nasal spray    Voice hoarsenesses    - if no improvement after above, would refer to Dr. Gastelum with ENT at [facility]    Routine medical exam    - not addressed.  Future labs only    Relevant Orders    Comprehensive metabolic panel    Hemoglobin A1c    Flu vaccine need    - vaccinate    Relevant Orders    Influenza - Quadrivalent (3 years & older) (PF) (Completed)            Health Maintenance reviewed, as above.    Follow-up: Follow-up in about 4 weeks (around 2/26/2019) for Routine Physical.  ______________________________________________________________________    Chief Complaint  Chief Complaint   Patient presents with    Cough    Sore Throat       HPI  Jasmyn Garcia is a 33 y.o. female with medical diagnoses as listed in the medical history and problem list that presents for cough and sore throat.  Pt is known to me with her last appointment Visit date not found.      Reports that her symptoms are better overall but she is now mostly having the  sensation that something is stuck in the back of her throat.  No dysphagia, F/C/NS, heartburn.  She feels that something is irritating her in her throat.  No masses palpated by the patient.  Jerald been taking mucinex and Vit C when there URI symptoms started but is no longer taking anything for this.  Notes that her voice is more hoarse and seems to fatigue after singing in the choir at [employer] or when she first wakes up.        PAST MEDICAL HISTORY:  Past Medical History:   Diagnosis Date    Hypertension     resolved with weight loss       PAST SURGICAL HISTORY:  Past Surgical History:   Procedure Laterality Date     SECTION         SOCIAL HISTORY:  Social History     Socioeconomic History    Marital status: Single     Spouse name: Not on file    Number of children: Not on file    Years of education: Not on file    Highest education level: Not on file   Social Needs    Financial resource strain: Not on file    Food insecurity - worry: Not on file    Food insecurity - inability: Not on file    Transportation needs - medical: Not on file    Transportation needs - non-medical: Not on file   Occupational History    Not on file   Tobacco Use    Smoking status: Former Smoker     Packs/day: 0.50     Types: Cigars    Smokeless tobacco: Never Used   Substance and Sexual Activity    Alcohol use: Yes     Comment: occasional    Drug use: No    Sexual activity: Not Currently   Other Topics Concern    Not on file   Social History Narrative    Not on file       FAMILY HISTORY:  Family History   Problem Relation Age of Onset    Hypertension Mother     Diabetes Mother     Heart disease Mother 32        MI then CAD    Hypertension Father     Hypertension Sister     Diabetes Brother        ALLERGIES AND MEDICATIONS: updated and reviewed.  Review of patient's allergies indicates:  No Known Allergies  Current Outpatient Medications   Medication Sig Dispense Refill    cetirizine (ZYRTEC) 10 MG tablet Take  "1 tablet (10 mg total) by mouth every evening. 90 tablet 1    fluticasone (FLONASE) 50 mcg/actuation nasal spray 1 spray (50 mcg total) by Each Nare route once daily. 1 Bottle 3    ibuprofen (ADVIL,MOTRIN) 600 MG tablet Take 1 tablet (600 mg total) by mouth every 6 (six) hours as needed for Pain. 20 tablet 0    multivitamin capsule Take 1 capsule by mouth once daily.       No current facility-administered medications for this visit.          ROS  Review of Systems   Constitutional: Negative for chills, fever and unexpected weight change.   HENT: Positive for postnasal drip and voice change. Negative for congestion, ear pain, hearing loss, rhinorrhea, sore throat and trouble swallowing.    Eyes: Negative for discharge, redness and visual disturbance.   Respiratory: Negative for cough, chest tightness, shortness of breath and wheezing.    Cardiovascular: Negative for chest pain, palpitations and leg swelling.   Gastrointestinal: Negative for abdominal pain, constipation, diarrhea, nausea and vomiting.   Endocrine: Negative for polydipsia, polyphagia and polyuria.   Genitourinary: Negative for decreased urine volume, dysuria and hematuria.   Musculoskeletal: Negative for arthralgias, joint swelling and myalgias.   Skin: Negative for color change and rash.   Neurological: Negative for dizziness, weakness, light-headedness and headaches.   Psychiatric/Behavioral: Negative for decreased concentration, dysphoric mood, sleep disturbance and suicidal ideas.           Physical Exam  Vitals:    01/29/19 1504 01/29/19 1518   BP: (!) 130/90 126/86   Pulse: 97    Temp: 98.3 °F (36.8 °C)    SpO2: 97%    Weight: 90.7 kg (199 lb 15.3 oz)    Height: 5' 7" (1.702 m)     Body mass index is 31.32 kg/m².  Weight: 90.7 kg (199 lb 15.3 oz)   Height: 5' 7" (170.2 cm)   Physical Exam   Constitutional: She is oriented to person, place, and time. She appears well-developed and well-nourished. No distress.   HENT:   Head: Normocephalic and " atraumatic.   Right Ear: Tympanic membrane, external ear and ear canal normal.   Left Ear: Tympanic membrane, external ear and ear canal normal.   Nose: Mucosal edema and rhinorrhea present. Right sinus exhibits no maxillary sinus tenderness and no frontal sinus tenderness. Left sinus exhibits no maxillary sinus tenderness and no frontal sinus tenderness.   Mouth/Throat: Uvula is midline and mucous membranes are normal. Posterior oropharyngeal erythema present. No oropharyngeal exudate, posterior oropharyngeal edema or tonsillar abscesses. No tonsillar exudate.       Eyes: Conjunctivae, EOM and lids are normal. Pupils are equal, round, and reactive to light. No scleral icterus.   Neck: Full passive range of motion without pain. Neck supple. No JVD present. Carotid bruit is not present. No thyromegaly present.   Cardiovascular: Normal rate, regular rhythm, normal heart sounds, intact distal pulses and normal pulses. Exam reveals no S3, no S4 and no friction rub.   No murmur heard.  Pulmonary/Chest: Effort normal and breath sounds normal. She has no wheezes. She has no rhonchi. She has no rales.   Abdominal: Soft. Bowel sounds are normal. There is no tenderness.   Musculoskeletal: She exhibits no edema or tenderness.   Lymphadenopathy:        Head (right side): No submental and no submandibular adenopathy present.        Head (left side): No submental and no submandibular adenopathy present.     She has no cervical adenopathy.   Neurological: She is alert and oriented to person, place, and time.   Motor grossly intact.  Sensory grossly intact.  Symmetric facial movements palate elevated symmetrically tongue midline   Skin: Skin is warm and dry. No rash noted.   Psychiatric: She has a normal mood and affect. Her speech is normal and behavior is normal. Thought content normal.           Health Maintenance       Date Due Completion Date    Pap Smear with HPV Cotest 08/23/2004 ---    TETANUS VACCINE 05/07/2018 5/7/2008     Influenza Vaccine 08/01/2018 10/19/2016

## 2019-02-02 ENCOUNTER — LAB VISIT (OUTPATIENT)
Dept: LAB | Facility: HOSPITAL | Age: 34
End: 2019-02-02
Attending: INTERNAL MEDICINE
Payer: COMMERCIAL

## 2019-02-02 DIAGNOSIS — Z00.00 ROUTINE MEDICAL EXAM: ICD-10-CM

## 2019-02-02 LAB
ALBUMIN SERPL BCP-MCNC: 3.5 G/DL
ALP SERPL-CCNC: 52 U/L
ALT SERPL W/O P-5'-P-CCNC: 5 U/L
ANION GAP SERPL CALC-SCNC: 8 MMOL/L
AST SERPL-CCNC: 14 U/L
BILIRUB SERPL-MCNC: 1 MG/DL
BUN SERPL-MCNC: 7 MG/DL
CALCIUM SERPL-MCNC: 9.2 MG/DL
CHLORIDE SERPL-SCNC: 104 MMOL/L
CO2 SERPL-SCNC: 24 MMOL/L
CREAT SERPL-MCNC: 0.8 MG/DL
EST. GFR  (AFRICAN AMERICAN): >60 ML/MIN/1.73 M^2
EST. GFR  (NON AFRICAN AMERICAN): >60 ML/MIN/1.73 M^2
ESTIMATED AVG GLUCOSE: 103 MG/DL
GLUCOSE SERPL-MCNC: 76 MG/DL
HBA1C MFR BLD HPLC: 5.2 %
POTASSIUM SERPL-SCNC: 3.7 MMOL/L
PROT SERPL-MCNC: 6.9 G/DL
SODIUM SERPL-SCNC: 136 MMOL/L

## 2019-02-02 PROCEDURE — 80053 COMPREHEN METABOLIC PANEL: CPT

## 2019-02-02 PROCEDURE — 83036 HEMOGLOBIN GLYCOSYLATED A1C: CPT

## 2019-02-02 PROCEDURE — 36415 COLL VENOUS BLD VENIPUNCTURE: CPT | Mod: PO

## 2019-02-13 ENCOUNTER — OFFICE VISIT (OUTPATIENT)
Dept: FAMILY MEDICINE | Facility: CLINIC | Age: 34
End: 2019-02-13
Payer: COMMERCIAL

## 2019-02-13 VITALS
BODY MASS INDEX: 31.01 KG/M2 | HEART RATE: 71 BPM | TEMPERATURE: 99 F | OXYGEN SATURATION: 99 % | HEIGHT: 67 IN | WEIGHT: 197.56 LBS | DIASTOLIC BLOOD PRESSURE: 84 MMHG | SYSTOLIC BLOOD PRESSURE: 120 MMHG

## 2019-02-13 DIAGNOSIS — J32.9 SINUSITIS, UNSPECIFIED CHRONICITY, UNSPECIFIED LOCATION: ICD-10-CM

## 2019-02-13 DIAGNOSIS — Z01.419 ENCOUNTER FOR GYNECOLOGICAL EXAMINATION WITHOUT ABNORMAL FINDING: Primary | ICD-10-CM

## 2019-02-13 DIAGNOSIS — Z12.4 CERVICAL CANCER SCREENING: ICD-10-CM

## 2019-02-13 PROCEDURE — 3008F PR BODY MASS INDEX (BMI) DOCUMENTED: ICD-10-PCS | Mod: CPTII,S$GLB,, | Performed by: NURSE PRACTITIONER

## 2019-02-13 PROCEDURE — 3074F SYST BP LT 130 MM HG: CPT | Mod: CPTII,S$GLB,, | Performed by: NURSE PRACTITIONER

## 2019-02-13 PROCEDURE — 3074F PR MOST RECENT SYSTOLIC BLOOD PRESSURE < 130 MM HG: ICD-10-PCS | Mod: CPTII,S$GLB,, | Performed by: NURSE PRACTITIONER

## 2019-02-13 PROCEDURE — 99999 PR PBB SHADOW E&M-EST. PATIENT-LVL IV: CPT | Mod: PBBFAC,,, | Performed by: NURSE PRACTITIONER

## 2019-02-13 PROCEDURE — 87624 HPV HI-RISK TYP POOLED RSLT: CPT

## 2019-02-13 PROCEDURE — 99214 PR OFFICE/OUTPT VISIT, EST, LEVL IV, 30-39 MIN: ICD-10-PCS | Mod: S$GLB,,, | Performed by: NURSE PRACTITIONER

## 2019-02-13 PROCEDURE — 88175 CYTOPATH C/V AUTO FLUID REDO: CPT

## 2019-02-13 PROCEDURE — 3079F DIAST BP 80-89 MM HG: CPT | Mod: CPTII,S$GLB,, | Performed by: NURSE PRACTITIONER

## 2019-02-13 PROCEDURE — 3008F BODY MASS INDEX DOCD: CPT | Mod: CPTII,S$GLB,, | Performed by: NURSE PRACTITIONER

## 2019-02-13 PROCEDURE — 3079F PR MOST RECENT DIASTOLIC BLOOD PRESSURE 80-89 MM HG: ICD-10-PCS | Mod: CPTII,S$GLB,, | Performed by: NURSE PRACTITIONER

## 2019-02-13 PROCEDURE — 99999 PR PBB SHADOW E&M-EST. PATIENT-LVL IV: ICD-10-PCS | Mod: PBBFAC,,, | Performed by: NURSE PRACTITIONER

## 2019-02-13 PROCEDURE — 99214 OFFICE O/P EST MOD 30 MIN: CPT | Mod: S$GLB,,, | Performed by: NURSE PRACTITIONER

## 2019-02-13 RX ORDER — AMOXICILLIN AND CLAVULANATE POTASSIUM 875; 125 MG/1; MG/1
1 TABLET, FILM COATED ORAL 2 TIMES DAILY
Qty: 20 TABLET | Refills: 0 | Status: SHIPPED | OUTPATIENT
Start: 2019-02-13 | End: 2019-02-23

## 2019-02-13 NOTE — PROGRESS NOTES
Subjective:       Patient ID: Jasmyn Garcia is a 33 y.o. female.    Chief Complaint: Annual Exam and Gynecologic Exam    33-year-old female presents to the clinic today for an annual gyn exam.  Her last period was 02/15/2019 the last approximately 4 days.  She states that her periods are regular.  She is a  3 para 2.  Her last Pap smear was greater than 3 years.  She has had an abnormal Pap smear in the past but did not require any treatment or biopsy.  She denies any vaginal discharge or vaginal bleeding.  She has had her left ovary removed due to a complex cyst.  She denies any masses, tenderness, pain, or nipple discharge to either breast.  She does check her breasts monthly.  No family history of breast cancer.  She also complains of ongoing sinus congestion and a sore throat for the past month.  She has been taking Zyrtec and Flonase which seems to be helping some but the symptoms will not completely go away.  She denies any fever, chills, ear pain, coughing, wheezing, shortness of breath, abdominal pain, nausea, vomiting, or diarrhea.  She denies any headaches, dizziness, or blurred vision.      Past Medical History:   Diagnosis Date    Hypertension     resolved with weight loss     Past Surgical History:   Procedure Laterality Date     SECTION        reports that she has quit smoking. Her smoking use included cigars. She smoked 0.50 packs per day. she has never used smokeless tobacco. She reports that she drinks alcohol. She reports that she does not use drugs.  Review of Systems   Constitutional: Negative for chills and fever.   HENT: Positive for congestion and sore throat. Negative for ear discharge, ear pain, postnasal drip, rhinorrhea, sinus pressure and sneezing.    Eyes: Negative for pain, discharge and itching.   Respiratory: Negative for cough, shortness of breath and wheezing.    Cardiovascular: Negative for chest pain, palpitations and leg swelling.   Gastrointestinal:  Negative for abdominal pain, diarrhea, nausea and vomiting.   Genitourinary: Negative for difficulty urinating, dysuria, flank pain, hematuria, pelvic pain, vaginal bleeding, vaginal discharge and vaginal pain.        Denies any masses, tenderness, pain or nipple discharge to either breast    Musculoskeletal: Negative for back pain, neck pain and neck stiffness.   Skin: Negative for rash.   Neurological: Negative for dizziness, light-headedness and headaches.   Hematological: Negative for adenopathy.       Objective:      Physical Exam   Constitutional: She is oriented to person, place, and time. She appears well-developed and well-nourished. No distress.   HENT:   Head: Normocephalic and atraumatic.   Right Ear: External ear normal.   Left Ear: External ear normal.   Mouth/Throat: Oropharynx is clear and moist. No oropharyngeal exudate.   Both nares red and inflamed with tenderness over left maxillary sinuses    Eyes: Conjunctivae and EOM are normal. Pupils are equal, round, and reactive to light. Right eye exhibits no discharge. Left eye exhibits no discharge. No scleral icterus.   Neck: Normal range of motion. Neck supple.   Cardiovascular: Normal rate and regular rhythm. Exam reveals no gallop and no friction rub.   No murmur heard.  Pulmonary/Chest: Effort normal and breath sounds normal. No respiratory distress. She has no wheezes. She has no rales.   Abdominal: Soft. Bowel sounds are normal. There is no tenderness.   Genitourinary: Vagina normal and uterus normal. No vaginal discharge found.   Genitourinary Comments: Pap smear obtained without any difficulty bimanual no CMT no pain, masses or tenderness over adnexa   Breast symmetrical no palpable masses, tenderness, pain or nipple discharge noted no axillary adenopathy noted    Musculoskeletal: Normal range of motion. She exhibits no edema.   Lymphadenopathy:     She has no cervical adenopathy.   Neurological: She is alert and oriented to person, place, and  time.   Skin: Skin is warm and dry. No rash noted. She is not diaphoretic.   Psychiatric: She has a normal mood and affect.       Assessment:       1. Encounter for gynecological examination without abnormal finding    2. Cervical cancer screening    3. Sinusitis, unspecified chronicity, unspecified location        Plan:         Encounter for gynecological examination without abnormal finding    Cervical cancer screening  -     Liquid-based pap smear, screening  -     HPV High Risk Genotypes, PCR    Sinusitis, unspecified chronicity, unspecified location  -     amoxicillin-clavulanate 875-125mg (AUGMENTIN) 875-125 mg per tablet; Take 1 tablet by mouth 2 (two) times daily. for 10 days  Dispense: 20 tablet; Refill: 0

## 2019-02-13 NOTE — PATIENT INSTRUCTIONS
Augmentin one twice a day with food for the next 10 days  Continue Zyrtec and Flonase  Pap obtained without any difficulty will send results via my [facility]

## 2019-02-18 ENCOUNTER — PATIENT MESSAGE (OUTPATIENT)
Dept: FAMILY MEDICINE | Facility: CLINIC | Age: 34
End: 2019-02-18

## 2019-02-19 LAB
HPV HR 12 DNA CVX QL NAA+PROBE: POSITIVE
HPV16 AG SPEC QL: NEGATIVE
HPV18 DNA SPEC QL NAA+PROBE: NEGATIVE

## 2019-02-19 NOTE — TELEPHONE ENCOUNTER
Patient stated nursing board stated as long as its not a control medication. She does not need form filled out

## 2019-02-21 ENCOUNTER — TELEPHONE (OUTPATIENT)
Dept: FAMILY MEDICINE | Facility: CLINIC | Age: 34
End: 2019-02-21

## 2019-02-21 NOTE — TELEPHONE ENCOUNTER
----- Message from Brianna Baum sent at 2/21/2019  3:59 PM CST -----  Contact: Self  Patient called to notify Nelsy that paperwork does not need to be completed. Please call at [phone]

## 2019-03-04 ENCOUNTER — PATIENT MESSAGE (OUTPATIENT)
Dept: FAMILY MEDICINE | Facility: CLINIC | Age: 34
End: 2019-03-04

## 2019-05-15 ENCOUNTER — PATIENT MESSAGE (OUTPATIENT)
Dept: FAMILY MEDICINE | Facility: CLINIC | Age: 34
End: 2019-05-15

## 2019-05-15 DIAGNOSIS — R22.0 SWELLING OF BOTH LIPS: Primary | ICD-10-CM

## 2019-05-24 ENCOUNTER — TELEPHONE (OUTPATIENT)
Dept: FAMILY MEDICINE | Facility: CLINIC | Age: 34
End: 2019-05-24

## 2019-05-24 NOTE — LETTER
May 24, 2019    Jasmyn Garcia  [address]  [place] [address]             [facility]  [address]  [place] [address]  Phone: [phone]  Fax: [phone] Dear Ms. Garcia:    I have been unable to reach you by phone for your appointment to Allergy .  Please call me at the clinic [phone] to book your appointment.      If you have any questions or concerns, please don't hesitate to call.    Sincerely,        Екатерина Wagner MA

## 2019-07-08 ENCOUNTER — PATIENT OUTREACH (OUTPATIENT)
Dept: ADMINISTRATIVE | Facility: OTHER | Age: 34
End: 2019-07-08

## 2019-07-12 ENCOUNTER — OFFICE VISIT (OUTPATIENT)
Dept: ALLERGY | Facility: CLINIC | Age: 34
End: 2019-07-12
Payer: COMMERCIAL

## 2019-07-12 ENCOUNTER — PATIENT OUTREACH (OUTPATIENT)
Dept: ADMINISTRATIVE | Facility: OTHER | Age: 34
End: 2019-07-12

## 2019-07-12 ENCOUNTER — LAB VISIT (OUTPATIENT)
Dept: LAB | Facility: HOSPITAL | Age: 34
End: 2019-07-12
Attending: STUDENT IN AN ORGANIZED HEALTH CARE EDUCATION/TRAINING PROGRAM
Payer: COMMERCIAL

## 2019-07-12 VITALS — HEART RATE: 65 BPM | OXYGEN SATURATION: 99 % | WEIGHT: 207.25 LBS | BODY MASS INDEX: 32.53 KG/M2 | HEIGHT: 67 IN

## 2019-07-12 DIAGNOSIS — T78.3XXD ANGIOEDEMA, SUBSEQUENT ENCOUNTER: Primary | ICD-10-CM

## 2019-07-12 DIAGNOSIS — T78.3XXD ANGIOEDEMA, SUBSEQUENT ENCOUNTER: ICD-10-CM

## 2019-07-12 LAB
25(OH)D3+25(OH)D2 SERPL-MCNC: 21 NG/ML (ref 30–96)
C4 SERPL-MCNC: 31 MG/DL (ref 11–44)
ERYTHROCYTE [DISTWIDTH] IN BLOOD BY AUTOMATED COUNT: 12.6 % (ref 11.5–14.5)
HCT VFR BLD AUTO: 36.9 % (ref 37–48.5)
HGB BLD-MCNC: 11.7 G/DL (ref 12–16)
IMM GRANULOCYTES # BLD AUTO: 0.02 K/UL (ref 0–0.04)
MCH RBC QN AUTO: 28.2 PG (ref 27–31)
MCHC RBC AUTO-ENTMCNC: 31.7 G/DL (ref 32–36)
MCV RBC AUTO: 89 FL (ref 82–98)
NEUTROPHILS # BLD AUTO: 3.8 K/UL (ref 1.8–7.7)
PLATELET # BLD AUTO: 239 K/UL (ref 150–350)
PMV BLD AUTO: 10.2 FL (ref 9.2–12.9)
RBC # BLD AUTO: 4.15 M/UL (ref 4–5.4)
TSH SERPL DL<=0.005 MIU/L-ACNC: 1.49 UIU/ML (ref 0.4–4)
WBC # BLD AUTO: 5.49 K/UL (ref 3.9–12.7)

## 2019-07-12 PROCEDURE — 99999 PR PBB SHADOW E&M-EST. PATIENT-LVL III: CPT | Mod: PBBFAC,,, | Performed by: STUDENT IN AN ORGANIZED HEALTH CARE EDUCATION/TRAINING PROGRAM

## 2019-07-12 PROCEDURE — 86161 COMPLEMENT/FUNCTION ACTIVITY: CPT

## 2019-07-12 PROCEDURE — 86003 ALLG SPEC IGE CRUDE XTRC EA: CPT

## 2019-07-12 PROCEDURE — 85027 COMPLETE CBC AUTOMATED: CPT

## 2019-07-12 PROCEDURE — 82306 VITAMIN D 25 HYDROXY: CPT

## 2019-07-12 PROCEDURE — 86800 THYROGLOBULIN ANTIBODY: CPT

## 2019-07-12 PROCEDURE — 86003 ALLG SPEC IGE CRUDE XTRC EA: CPT | Mod: 59

## 2019-07-12 PROCEDURE — 99244 OFF/OP CNSLTJ NEW/EST MOD 40: CPT | Mod: S$GLB,,, | Performed by: STUDENT IN AN ORGANIZED HEALTH CARE EDUCATION/TRAINING PROGRAM

## 2019-07-12 PROCEDURE — 84443 ASSAY THYROID STIM HORMONE: CPT

## 2019-07-12 PROCEDURE — 99999 PR PBB SHADOW E&M-EST. PATIENT-LVL III: ICD-10-PCS | Mod: PBBFAC,,, | Performed by: STUDENT IN AN ORGANIZED HEALTH CARE EDUCATION/TRAINING PROGRAM

## 2019-07-12 PROCEDURE — 88184 FLOWCYTOMETRY/ TC 1 MARKER: CPT

## 2019-07-12 PROCEDURE — 83520 IMMUNOASSAY QUANT NOS NONAB: CPT

## 2019-07-12 PROCEDURE — 86160 COMPLEMENT ANTIGEN: CPT

## 2019-07-12 PROCEDURE — 88185 FLOWCYTOMETRY/TC ADD-ON: CPT

## 2019-07-12 PROCEDURE — 36415 COLL VENOUS BLD VENIPUNCTURE: CPT | Mod: PO

## 2019-07-12 PROCEDURE — 86376 MICROSOMAL ANTIBODY EACH: CPT

## 2019-07-12 PROCEDURE — 99244 PR OFFICE CONSULTATION,LEVEL IV: ICD-10-PCS | Mod: S$GLB,,, | Performed by: STUDENT IN AN ORGANIZED HEALTH CARE EDUCATION/TRAINING PROGRAM

## 2019-07-12 NOTE — PATIENT INSTRUCTIONS
Testing  Blood work for allergy testing today       Check portal in one week for results or call [phone]       Contact me with questions or concerns       I will contact you if anything needs immediate attention.        Treatment  Extra Claritin or Zyrtec or Benadryl if swelling occurs in future        Return 2 weeks.          Nasal Sx  Increase Flonase (= fluticasone) nasal spray:  2 squirts each nostril twice a day   Remember to aim out toward your ear.   Needs to be used regularly for full effect.

## 2019-07-12 NOTE — PROGRESS NOTES
Allergy Clinic Note  [facility]    Subjective:      Patient ID: Jasmyn Garcia is a 33 y.o. female.    Chief Complaint: Angioedema      Referring Provider: Thony Smith    History of Present Illness:  33-year-old female referred from primary care following 3 episodes of lip and lower face swelling in 2019.  She is here alone, and she is a good historian.    Patient reports 3 discrete episodes of marked lip swelling and milder lower face swelling.  She has a photograph showing her lips about double other normal size.  She denies swelling of any other area.  She denies any itching, hives, or rash.  For the more she denies any associated chest symptoms, GI symptoms, rhino conjunctivitis symptoms.  There are no obvious precipitating factors.  The 1st episode occurred 10-15 minutes after eating crawfish indoors.  The 2nd episode occurred at the movie theater while eating popcorn and not shows.  The 3rd episode occurred at home after snacking throughout the day on various snack foods.  The most recent episode occurred May 14th.    She denies starting or stopping any hormones or any change in her cycle.  She admits taking occasional Aleve but denies that she took good on any of these 3 days.  She also denies the possibility of sting as she is rarely outside was definitely not outside on the days in question.    She reports her Pap smear and gyn exam are up-to-date.    She has no other allergic syndromes.    Since onset of swelling, client...  Denies fever, shakes, or chills  Denies change in weight, appetite, or energy level  Denies change in the texture of her hair, skin, or nails  Denies change in the appearance of urine or stool  Denies vomiting, diarrhea, constipation, or abdominal pain  Denies abnormal bleeding  Denies any new aches or pains, specifically myalgias or arthralgia,   Denies any unusual moles    Patient also reports a history of perennial rhinitis.  She reports that early in the  morning she has sneezing and itchy eyes with runny nose PE.  Later in the day she has nasal congestion.  There are no associated or precipitating symptoms.  Her primary care provider started her on Flonase and Claritin.  She stopped these briefly following the 1st episode of angioedema but has now restarted them.  She is satisfied with her current level of symptom control.    Additional History:  Past medical history is significant for  elevated blood pressure.  No Hx of ENT surgery. Family history is significant for seafood allergy in 3 siblings manifest by itching shortness of breath and vomiting.  She also has a sister with severe grass allergy and allergic bronchospasm  There is no family history of urticaria or angioedema.  Client  reports that she has quit smoking. Her smoking use included cigars. She smoked 0.50 packs per day. She has never used smokeless tobacco.  Exposures are notable for a dog at home.      Patient Active Problem List   Diagnosis    Elevated blood pressure reading without diagnosis of hypertension    Obesity, Class I, BMI 30-34.9    Trapezius muscle spasm     Current Outpatient Medications on File Prior to Visit   Medication Sig Dispense Refill    cetirizine (ZYRTEC) 10 MG tablet Take 1 tablet (10 mg total) by mouth every evening. 90 tablet 1    fluticasone (FLONASE) 50 mcg/actuation nasal spray 1 spray (50 mcg total) by Each Nare route once daily. 1 Bottle 3    multivitamin capsule Take 1 capsule by mouth once daily.       No current facility-administered medications on file prior to visit.          Review of Systems   Constitutional: Negative for chills and fever.   HENT: Positive for congestion. Negative for ear discharge and nosebleeds.    Eyes: Negative for discharge and redness.        Itchy eyes   Respiratory: Negative for hemoptysis, sputum production, shortness of breath, wheezing and stridor.    Cardiovascular: Negative for chest pain and palpitations.   Gastrointestinal:  "Negative for abdominal pain, blood in stool, melena and vomiting.   Genitourinary: Negative for dysuria and hematuria.   Skin: Negative for itching and rash.   Neurological: Negative for seizures and loss of consciousness.       Objective:   Pulse 65   Ht 5' 7" (1.702 m)   Wt 94 kg (207 lb 3.7 oz)   SpO2 99%   BMI 32.46 kg/m²     Physical Exam   Constitutional: She is oriented to person, place, and time and well-developed, well-nourished, and in no distress.   HENT:   Head: Normocephalic and atraumatic.   Nose: Nose normal.   Mouth/Throat: No oropharyngeal exudate.   TMs clear bilaterally.  Nares pale with moderate turbinates swelling.  Oropharynx benign without exudate.  Tongue is not coated.   Eyes: Conjunctivae are normal. Right eye exhibits no discharge. Left eye exhibits no discharge.   Neck: Neck supple. No thyromegaly present.   Cardiovascular: Normal rate, regular rhythm and intact distal pulses.   Pulmonary/Chest: Effort normal. No stridor. No respiratory distress. She has wheezes.   Abdominal: Soft. She exhibits no distension and no mass. There is no tenderness.   Liver 12 cm by scratch   Musculoskeletal: She exhibits no edema or deformity.   Lymphadenopathy:     She has no cervical adenopathy.   Neurological: She is alert and oriented to person, place, and time.   Skin: No rash noted. No erythema.   Psychiatric: Memory, affect and judgment normal.       Data:   CMP remarkable for ALT = 5 with similar values dating back at least 7 years.  Otherwise CMP of 02/02/2019 unremarkable.    No other recent labs.    Assessment:     1. Angioedema, subsequent encounter        Plan:     Medical decision making:  Patient is presenting following 3 episodes of angioedema of the lips and lower face.  No etiology is evident from initial history and physical.  The etiology is likely to be idiopathic.  We discussed the natural history of angioedema and I gave her handout.  Her primary concern is to rule out " crawfish/shellfish allergy or other food allergy.    Jasmyn was seen today for angioedema.    Diagnoses and all orders for this visit:    Angioedema, subsequent encounter  -     Crab IgE; Future  -     Crayfish, freshwater IgE; Future  -     Shrimp IgE; Future  -     Oyster IgE; Future  -     Allergen-Scallop; Future  -     Pierre IgE; Future  -     Allergen - Pistachio; Future  -     Peanut IgE; Future  -     Egg, white IgE; Future  -     Corn IgE; Future  -     Rast Allergen-Latex; Future  -     Sesame seed IgE; Future  -     C1 esterase inhibitor, functional; Future  -     C4 complement; Future  -     TSH; Future  -     Anti-thyroglobulin Antibody Level; Future  -     Antimicrosomal Antibody Level; Future  -     Anti-IgE Receptor Antibody Level; Future  -     Vitamin D Level; Future  -     Serum Tryptase; Future  -     CBC; Future        Patient Instructions   Testing  Blood work for allergy testing today       Check portal in one week for results or call [phone]       Contact me with questions or concerns       I will contact you if anything needs immediate attention.        Treatment  Extra Claritin or Zyrtec or Benadryl if swelling occurs in future        Return 2 weeks.          Nasal Sx  Increase Flonase (= fluticasone) nasal spray:  2 squirts each nostril twice a day   Remember to aim out toward your ear.   Needs to be used regularly for full effect.              Follow up in about 2 weeks (around 7/26/2019).    Jasmin Marion MD

## 2019-07-12 NOTE — LETTER
July 12, 2019      Thony Smith MD  [address]  [place] [address]           [facility]  [address] [place]  [place] [address]  Phone: [phone]          Patient: Jasmyn Garcia   MR Number: [id number]   YOB: 1985   Date of Visit: 7/12/2019       Dear Dr. Thony Smith:    Thank you for referring Jasmyn Garcia to me for evaluation. Attached you will find relevant portions of my assessment and plan of care.    If you have questions, please do not hesitate to call me. I look forward to following Jasmyn Garcia along with you.    Sincerely,    Jasmin Marion MD    Enclosure  CC:  No Recipients    If you would like to receive this communication electronically, please contact [email or web address] or [phone] to request more information on [email or web address] Link access.    For providers and/or their staff who would like to refer a patient to [facility], please contact us through our one-stop-shop provider referral line, [facility], at [phone].    If you feel you have received this communication in error or would no longer like to receive these types of communications, please e-mail [email or web address]

## 2019-07-13 LAB
THYROGLOB AB SERPL IA-ACNC: <4 IU/ML (ref 0–3.9)
THYROPEROXIDASE IGG SERPL-ACNC: <6 IU/ML

## 2019-07-14 DIAGNOSIS — T78.3XXD ANGIOEDEMA, SUBSEQUENT ENCOUNTER: ICD-10-CM

## 2019-07-14 DIAGNOSIS — E55.9 VITAMIN D INSUFFICIENCY: Primary | ICD-10-CM

## 2019-07-14 RX ORDER — VIT C/E/ZN/COPPR/LUTEIN/ZEAXAN 250MG-90MG
1000 CAPSULE ORAL DAILY
Refills: 0 | COMMUNITY
Start: 2019-07-14

## 2019-07-16 ENCOUNTER — TELEPHONE (OUTPATIENT)
Dept: ALLERGY | Facility: CLINIC | Age: 34
End: 2019-07-16

## 2019-07-16 LAB — TRYPTASE LEVEL: 4.6 NG/ML

## 2019-07-16 NOTE — TELEPHONE ENCOUNTER
Called and spoke with the patient and relayed that some labs were not in yet but that the vitamin D level is low and that she should start taking a Vitamin D Supplement.    ----- Message from Jasmin Marion MD sent at 7/14/2019  6:58 PM CDT -----  Regarding: results / vit D  Most of your labs are still pending, so the cause of your swelling is still unknown.    However, your level of vitamin D is definitely low.    Dr. Marion recommends a vitamin D supplement at 800-1000 units per day.  She has written a prescription, but feel free to buy any brand you prefer.

## 2019-07-17 LAB
ALLERGEN LATEX IGE: <0.35 KU/L
CORN IGE QN: <0.35 KU/L
CRAB IGE QN: <0.35 KU/L
CRAWFISH IGE QN: <0.35 KU/L
DEPRECATED CORN IGE RAST QL: NORMAL
DEPRECATED CRAB IGE RAST QL: NORMAL
DEPRECATED CRAWFISH IGE RAST QL: NORMAL
DEPRECATED EGG WHITE IGE RAST QL: NORMAL
DEPRECATED OYSTER IGE RAST QL: NORMAL
DEPRECATED PEANUT IGE RAST QL: NORMAL
DEPRECATED PISTACHIO IGE RAST QL: NORMAL
DEPRECATED SCALLOP IGE RAST QL: NORMAL
DEPRECATED SESAME SEED IGE RAST QL: NORMAL
DEPRECATED SHRIMP IGE RAST QL: NORMAL
DEPRECATED WALNUT IGE RAST QL: NORMAL
EGG WHITE IGE QN: <0.35 KU/L
LATEX CLASS: NORMAL
OYSTER IGE QN: <0.35 KU/L
PEANUT IGE QN: <0.35 KU/L
PISTACHIO IGE QN: <0.35 KU/L
SCALLOP IGE QN: <0.35 KU/L
SESAME SEED IGE QN: <0.35 KU/L
SHRIMP IGE QN: <0.35 KU/L
WALNUT IGE QN: <0.35 KU/L

## 2019-07-22 LAB — C1INH FUNCTIONAL/C1INH TOTAL MFR SERPL: >90 %

## 2019-07-24 ENCOUNTER — PATIENT OUTREACH (OUTPATIENT)
Dept: ADMINISTRATIVE | Facility: OTHER | Age: 34
End: 2019-07-24

## 2019-07-25 LAB
CIU ASSOCIATED BASOPHIL ACTIVATION: 5.7 % (ref 0–12)
IGE RECEPTOR ANTIBODY: NORMAL

## 2019-07-31 ENCOUNTER — PATIENT OUTREACH (OUTPATIENT)
Dept: ADMINISTRATIVE | Facility: OTHER | Age: 34
End: 2019-07-31

## 2019-08-01 NOTE — PROGRESS NOTES
Allergy Clinic Note  [facility]    Subjective:      Chief Complaint: Follow-up        Allergy Problem List  Angioedema, 3 episodes in 2019        History of Present Illness: Jasmyn Garcia is a 33 y.o. 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    She has had no swelling episodes since last visit.    At last visit she reported 3 episodes of lip and facial swelling consistent with angioedema.  No etiology was evident.  Limited laboratory evaluation was ordered.      Following last visit, low vitamin-D level was noted and she was instructed to begin replacement therapy at 80- 1000 units daily.  She reports she is doing this    Related medications  Flonase  Claritin  Vitamin-D 1000 units per day    No new problems or complaints.    Additional History:   Interval history is unremarkable.   Client   reports that she has quit smoking. Her smoking use included cigars. She smoked 0.50 packs per day. She has never used smokeless tobacco.. Past medical, family, and social histories are unchanged.    Patient Active Problem List   Diagnosis    Elevated blood pressure reading without diagnosis of hypertension    Obesity, Class I, BMI 30-34.9    Trapezius muscle spasm     Current Outpatient Medications on File Prior to Visit   Medication Sig Dispense Refill    cetirizine (ZYRTEC) 10 MG tablet Take 1 tablet (10 mg total) by mouth every evening. 90 tablet 1    cholecalciferol, vitamin D3, (VITAMIN D3) 1,000 unit capsule Take 1 capsule (1,000 Units total) by mouth once daily.  0    fluticasone (FLONASE) 50 mcg/actuation nasal spray 1 spray (50 mcg total) by Each Nare route once daily. 1 Bottle 3    multivitamin capsule Take 1 capsule by mouth once daily.       No current facility-administered medications on file prior to visit.          Review of Systems   Constitutional: Negative for chills and fever.   HENT: Negative for ear  "discharge and nosebleeds.    Eyes: Negative for discharge and redness.   Respiratory: Negative for hemoptysis, sputum production, shortness of breath, wheezing and stridor.    Cardiovascular: Negative for chest pain and palpitations.   Gastrointestinal: Negative for blood in stool, melena and vomiting.   Genitourinary: Positive for dysuria. Negative for hematuria.   Skin: Negative for itching and rash.   Neurological: Negative for seizures and loss of consciousness.       Objective:   Ht 5' 7" (1.702 m)   Wt 95.2 kg (209 lb 14.1 oz)   BMI 32.87 kg/m²       Physical Exam   Constitutional: She is oriented to person, place, and time and well-developed, well-nourished, and in no distress.   HENT:   Head: Normocephalic and atraumatic.   Nose: Nose normal.   Eyes: Conjunctivae are normal. Right eye exhibits no discharge. Left eye exhibits no discharge.   Neck: Neck supple.   Cardiovascular: Normal rate, regular rhythm and intact distal pulses.   Pulmonary/Chest: Effort normal. No stridor. No respiratory distress.   Abdominal: She exhibits no distension.   Musculoskeletal: She exhibits no edema or deformity.   Neurological: She is alert and oriented to person, place, and time.   Skin: No rash noted. No erythema.   Psychiatric: Memory, affect and judgment normal.       Data:   Selected food testing by the Immunocap method (07/12/2019) was negative to all food allergens tested, specifically crustaceans (shrimp, crawfish, crab), mollusks (scallop, oyster), sesame seed, corn, egg white, peanut, pistachio, walnut.    Specific IgE testing to latex was also negative    Labs (07/12/2019) normal serum tryptase  Normal basophil activation  Normal TSH with negative thyroid autoantibodies  Normal C4 and normal C1 esterase inhibitor function  Low vitamin D 21    Assessment:     1. Angioedema, subsequent encounter    2. Vitamin D insufficiency        Plan:     Medical decision making:  Patient is presenting with idiopathic recurrent " angioedema.  She has no evidence of C1 esterase inhibitor deficiency or of food allergy.  Discussed the diagnosis and natural history today.      Her labs were notable for low vitamin-D.  She has already started replacement therapy.    Diagnoses and all orders for this visit:    Angioedema, subsequent encounter - stable  - results discussed and copy given  - return as needed  - return 1 year if continuing to have swelling episodes.    Vitamin D insufficiency  - continue 1000 units per day replacement therapy        Patient Instructions   No cause of swelling was identified  Labs were notable only for low vitamin-D.          Follow-up as needed, particularly if swelling episodes become more frequent, pattern changes, or new symptoms develop.  Follow up in about 1 year (around 8/2/2020) if she continues to have swelling episodes, or if symptoms worsen or fail to improve.  Patient instructed to let me know if swelling episodes stop.    Jasmin Marion MD

## 2019-08-02 ENCOUNTER — OFFICE VISIT (OUTPATIENT)
Dept: ALLERGY | Facility: CLINIC | Age: 34
End: 2019-08-02
Payer: COMMERCIAL

## 2019-08-02 VITALS — BODY MASS INDEX: 32.94 KG/M2 | HEIGHT: 67 IN | WEIGHT: 209.88 LBS

## 2019-08-02 DIAGNOSIS — E55.9 VITAMIN D INSUFFICIENCY: ICD-10-CM

## 2019-08-02 DIAGNOSIS — T78.3XXD ANGIOEDEMA, SUBSEQUENT ENCOUNTER: Primary | ICD-10-CM

## 2019-08-02 PROCEDURE — 99213 OFFICE O/P EST LOW 20 MIN: CPT | Mod: S$GLB,,, | Performed by: STUDENT IN AN ORGANIZED HEALTH CARE EDUCATION/TRAINING PROGRAM

## 2019-08-02 PROCEDURE — 99213 PR OFFICE/OUTPT VISIT, EST, LEVL III, 20-29 MIN: ICD-10-PCS | Mod: S$GLB,,, | Performed by: STUDENT IN AN ORGANIZED HEALTH CARE EDUCATION/TRAINING PROGRAM

## 2019-08-02 PROCEDURE — 99999 PR PBB SHADOW E&M-EST. PATIENT-LVL III: CPT | Mod: PBBFAC,,, | Performed by: STUDENT IN AN ORGANIZED HEALTH CARE EDUCATION/TRAINING PROGRAM

## 2019-08-02 PROCEDURE — 99999 PR PBB SHADOW E&M-EST. PATIENT-LVL III: ICD-10-PCS | Mod: PBBFAC,,, | Performed by: STUDENT IN AN ORGANIZED HEALTH CARE EDUCATION/TRAINING PROGRAM

## 2019-08-02 NOTE — PATIENT INSTRUCTIONS
No cause of swelling was identified  Labs were notable only for low vitamin-D.      Follow-up as needed, particularly if swelling episodes become more frequent, pattern changes, or new symptoms develop.  Follow up in

## 2019-10-10 ENCOUNTER — OFFICE VISIT (OUTPATIENT)
Dept: FAMILY MEDICINE | Facility: CLINIC | Age: 34
End: 2019-10-10
Payer: COMMERCIAL

## 2019-10-10 ENCOUNTER — LAB VISIT (OUTPATIENT)
Dept: LAB | Facility: HOSPITAL | Age: 34
End: 2019-10-10
Attending: NURSE PRACTITIONER
Payer: COMMERCIAL

## 2019-10-10 VITALS
HEIGHT: 67 IN | BODY MASS INDEX: 31.12 KG/M2 | OXYGEN SATURATION: 98 % | TEMPERATURE: 99 F | HEART RATE: 64 BPM | DIASTOLIC BLOOD PRESSURE: 84 MMHG | SYSTOLIC BLOOD PRESSURE: 120 MMHG | WEIGHT: 198.31 LBS

## 2019-10-10 DIAGNOSIS — Z00.00 ROUTINE PHYSICAL EXAMINATION: ICD-10-CM

## 2019-10-10 DIAGNOSIS — Z09 NEED FOR IMMUNIZATION FOLLOW-UP: ICD-10-CM

## 2019-10-10 DIAGNOSIS — Z09 NEED FOR IMMUNIZATION FOLLOW-UP: Primary | ICD-10-CM

## 2019-10-10 DIAGNOSIS — Z23 NEED FOR TDAP VACCINATION: ICD-10-CM

## 2019-10-10 DIAGNOSIS — J30.1 SEASONAL ALLERGIC RHINITIS DUE TO POLLEN: ICD-10-CM

## 2019-10-10 DIAGNOSIS — Z23 IMMUNIZATION DUE: ICD-10-CM

## 2019-10-10 PROCEDURE — 86706 HEP B SURFACE ANTIBODY: CPT

## 2019-10-10 PROCEDURE — 99395 PR PREVENTIVE VISIT,EST,18-39: ICD-10-PCS | Mod: 25,S$GLB,, | Performed by: NURSE PRACTITIONER

## 2019-10-10 PROCEDURE — 3074F SYST BP LT 130 MM HG: CPT | Mod: CPTII,S$GLB,, | Performed by: NURSE PRACTITIONER

## 2019-10-10 PROCEDURE — 99999 PR PBB SHADOW E&M-EST. PATIENT-LVL IV: ICD-10-PCS | Mod: PBBFAC,,, | Performed by: NURSE PRACTITIONER

## 2019-10-10 PROCEDURE — 3079F PR MOST RECENT DIASTOLIC BLOOD PRESSURE 80-89 MM HG: ICD-10-PCS | Mod: CPTII,S$GLB,, | Performed by: NURSE PRACTITIONER

## 2019-10-10 PROCEDURE — 3074F PR MOST RECENT SYSTOLIC BLOOD PRESSURE < 130 MM HG: ICD-10-PCS | Mod: CPTII,S$GLB,, | Performed by: NURSE PRACTITIONER

## 2019-10-10 PROCEDURE — 90471 TDAP VACCINE GREATER THAN OR EQUAL TO 7YO IM: ICD-10-PCS | Mod: S$GLB,,, | Performed by: NURSE PRACTITIONER

## 2019-10-10 PROCEDURE — 86762 RUBELLA ANTIBODY: CPT

## 2019-10-10 PROCEDURE — 90715 TDAP VACCINE 7 YRS/> IM: CPT | Mod: S$GLB,,, | Performed by: NURSE PRACTITIONER

## 2019-10-10 PROCEDURE — 86765 RUBEOLA ANTIBODY: CPT

## 2019-10-10 PROCEDURE — 86735 MUMPS ANTIBODY: CPT

## 2019-10-10 PROCEDURE — 90471 IMMUNIZATION ADMIN: CPT | Mod: S$GLB,,, | Performed by: NURSE PRACTITIONER

## 2019-10-10 PROCEDURE — 3079F DIAST BP 80-89 MM HG: CPT | Mod: CPTII,S$GLB,, | Performed by: NURSE PRACTITIONER

## 2019-10-10 PROCEDURE — 90715 TDAP VACCINE GREATER THAN OR EQUAL TO 7YO IM: ICD-10-PCS | Mod: S$GLB,,, | Performed by: NURSE PRACTITIONER

## 2019-10-10 PROCEDURE — 99395 PREV VISIT EST AGE 18-39: CPT | Mod: 25,S$GLB,, | Performed by: NURSE PRACTITIONER

## 2019-10-10 PROCEDURE — 99999 PR PBB SHADOW E&M-EST. PATIENT-LVL IV: CPT | Mod: PBBFAC,,, | Performed by: NURSE PRACTITIONER

## 2019-10-10 NOTE — PATIENT INSTRUCTIONS
Check titers   Tdap   Will get copy of TB skin test down at [facility]

## 2019-10-10 NOTE — PROGRESS NOTES
Subjective:       Patient ID: Jasmyn Garcia is a 34 y.o. female.    Chief Complaint: Annual Exam    34-year-old presents to the clinic today for routine physical exam for school.  She denies any complaints today.  She needs titers from her immunizations.  She also needs a Tdap.  She states that she had TB skin test done at work and will get a copy of the results and attached to the form.    Past Medical History:   Diagnosis Date    Allergy     Angio-edema     Hypertension     resolved with weight loss     Past Surgical History:   Procedure Laterality Date     SECTION        reports that she has quit smoking. Her smoking use included cigars. She smoked 0.50 packs per day. She has never used smokeless tobacco. She reports that she drank alcohol. She reports that she does not use drugs.  Review of Systems   Constitutional: Negative for chills and fever.   HENT: Negative for congestion, ear discharge, ear pain, postnasal drip, rhinorrhea, sinus pressure and sore throat.    Eyes: Negative for pain, discharge, redness and itching.   Respiratory: Negative for cough, shortness of breath and wheezing.    Cardiovascular: Negative for chest pain, palpitations and leg swelling.   Gastrointestinal: Negative for abdominal pain, constipation, diarrhea, nausea and vomiting.   Genitourinary: Negative for dysuria and frequency.   Musculoskeletal: Negative for gait problem, neck pain and neck stiffness.   Skin: Negative for rash.   Neurological: Negative for dizziness, light-headedness and headaches.       Objective:      Physical Exam   Constitutional: She is oriented to person, place, and time. She appears well-developed and well-nourished. No distress.   HENT:   Head: Normocephalic and atraumatic.   Right Ear: External ear normal.   Left Ear: External ear normal.   Nose: Nose normal.   Mouth/Throat: Oropharynx is clear and moist.   Eyes: Pupils are equal, round, and reactive to light. Conjunctivae and EOM are  normal. Right eye exhibits no discharge. Left eye exhibits no discharge. No scleral icterus.   Neck: Normal range of motion. Neck supple. No JVD present. No thyromegaly present.   Cardiovascular: Normal rate and regular rhythm. Exam reveals no friction rub.   No murmur heard.  Pulmonary/Chest: Effort normal and breath sounds normal. No respiratory distress. She has no wheezes. She has no rales.   Abdominal: Soft. Bowel sounds are normal. There is no tenderness. There is no rebound and no guarding.   Musculoskeletal: Normal range of motion. She exhibits no edema.   Lymphadenopathy:     She has no cervical adenopathy.   Neurological: She is alert and oriented to person, place, and time. She displays normal reflexes.   Skin: Skin is warm and dry. No rash noted. She is not diaphoretic.   Psychiatric: She has a normal mood and affect. Her behavior is normal. Judgment and thought content normal.       Assessment:       1. Need for immunization follow-up    2. Routine physical examination    3. Seasonal allergic rhinitis due to pollen    4. Immunization due        Plan:         Need for immunization follow-up  -     Rubella antibody, IgG; Future; Expected date: 10/10/2019  -     Rubeola antibody IgG; Future; Expected date: 10/10/2019  -     Mumps, IgG Screen; Future; Expected date: 10/10/2019  -     Hepatitis B Surface Antibody, Qual/Quant; Future; Expected date: 10/10/2019    Routine physical examination  - normal     Seasonal allergic rhinitis due to pollen  - The current medical regimen is effective;  continue present plan and medications.    Immunization due  -     Tdap Vaccine        Patient will keep forms and bring back her copy of her TB skin test and get results of her titers

## 2019-10-11 LAB
HBV SURFACE AB SER QL IA: POSITIVE
HBV SURFACE AB SERPL IA-ACNC: 18 MIU/ML
MUMPS IGG INTERPRETATION: POSITIVE
MUMPS IGG SCREEN: 1.86 ISR (ref 0–0.9)
RUBEOLA IGG ANTIBODY: 2.25 ISR (ref 0–0.9)
RUBEOLA INTERPRETATION: POSITIVE
RUBV IGG SER-ACNC: 32.2 IU/ML
RUBV IGG SER-IMP: REACTIVE

## 2020-01-02 ENCOUNTER — HOSPITAL ENCOUNTER (EMERGENCY)
Facility: HOSPITAL | Age: 35
Discharge: HOME | End: 2020-01-03
Attending: EMERGENCY MEDICINE

## 2020-01-02 ENCOUNTER — PATIENT MESSAGE (OUTPATIENT)
Dept: FAMILY MEDICINE | Facility: CLINIC | Age: 35
End: 2020-01-02

## 2020-01-02 DIAGNOSIS — N12 PYELONEPHRITIS: Primary | ICD-10-CM

## 2020-01-02 LAB
ALBUMIN SERPL BCP-MCNC: 3.8 G/DL (ref 3.5–5.2)
ALP SERPL-CCNC: 54 U/L (ref 55–135)
ALT SERPL W/O P-5'-P-CCNC: 6 U/L (ref 10–44)
ANION GAP SERPL CALC-SCNC: 6 MMOL/L (ref 8–16)
AST SERPL-CCNC: 16 U/L (ref 10–40)
B-HCG UR QL: NEGATIVE
BACTERIA #/AREA URNS HPF: ABNORMAL /HPF
BASOPHILS # BLD AUTO: 0.02 K/UL (ref 0–0.2)
BASOPHILS NFR BLD: 0.3 % (ref 0–1.9)
BILIRUB SERPL-MCNC: 0.4 MG/DL (ref 0.1–1)
BILIRUB UR QL STRIP: NEGATIVE
BUN SERPL-MCNC: 6 MG/DL (ref 6–20)
CALCIUM SERPL-MCNC: 7.6 MG/DL (ref 8.7–10.5)
CHLORIDE SERPL-SCNC: 106 MMOL/L (ref 95–110)
CLARITY UR: CLEAR
CO2 SERPL-SCNC: 25 MMOL/L (ref 23–29)
COLOR UR: ABNORMAL
CREAT SERPL-MCNC: 0.7 MG/DL (ref 0.5–1.4)
CTP QC/QA: YES
DIFFERENTIAL METHOD: ABNORMAL
EOSINOPHIL # BLD AUTO: 0.1 K/UL (ref 0–0.5)
EOSINOPHIL NFR BLD: 0.8 % (ref 0–8)
ERYTHROCYTE [DISTWIDTH] IN BLOOD BY AUTOMATED COUNT: 12.8 % (ref 11.5–14.5)
EST. GFR  (AFRICAN AMERICAN): >60 ML/MIN/1.73 M^2
EST. GFR  (NON AFRICAN AMERICAN): >60 ML/MIN/1.73 M^2
GLUCOSE SERPL-MCNC: 90 MG/DL (ref 70–110)
GLUCOSE UR QL STRIP: NEGATIVE
HCT VFR BLD AUTO: 35.4 % (ref 37–48.5)
HGB BLD-MCNC: 11.7 G/DL (ref 12–16)
HGB UR QL STRIP: ABNORMAL
IMM GRANULOCYTES # BLD AUTO: 0.02 K/UL (ref 0–0.04)
IMM GRANULOCYTES NFR BLD AUTO: 0.3 % (ref 0–0.5)
KETONES UR QL STRIP: NEGATIVE
LACTATE SERPL-SCNC: 0.6 MMOL/L (ref 0.5–2.2)
LEUKOCYTE ESTERASE UR QL STRIP: ABNORMAL
LYMPHOCYTES # BLD AUTO: 1.3 K/UL (ref 1–4.8)
LYMPHOCYTES NFR BLD: 20.8 % (ref 18–48)
MCH RBC QN AUTO: 28.3 PG (ref 27–31)
MCHC RBC AUTO-ENTMCNC: 33.1 G/DL (ref 32–36)
MCV RBC AUTO: 86 FL (ref 82–98)
MICROSCOPIC COMMENT: ABNORMAL
MONOCYTES # BLD AUTO: 0.5 K/UL (ref 0.3–1)
MONOCYTES NFR BLD: 7.1 % (ref 4–15)
NEUTROPHILS # BLD AUTO: 4.5 K/UL (ref 1.8–7.7)
NEUTROPHILS NFR BLD: 70.7 % (ref 38–73)
NITRITE UR QL STRIP: NEGATIVE
NRBC BLD-RTO: 0 /100 WBC
PH UR STRIP: 7 [PH] (ref 5–8)
PLATELET # BLD AUTO: 212 K/UL (ref 150–350)
PMV BLD AUTO: 9.4 FL (ref 9.2–12.9)
POTASSIUM SERPL-SCNC: 3.6 MMOL/L (ref 3.5–5.1)
PROT SERPL-MCNC: 6.9 G/DL (ref 6–8.4)
PROT UR QL STRIP: NEGATIVE
RBC # BLD AUTO: 4.14 M/UL (ref 4–5.4)
RBC #/AREA URNS HPF: 3 /HPF (ref 0–4)
SODIUM SERPL-SCNC: 137 MMOL/L (ref 136–145)
SP GR UR STRIP: 1.01 (ref 1–1.03)
URN SPEC COLLECT METH UR: ABNORMAL
UROBILINOGEN UR STRIP-ACNC: NEGATIVE EU/DL
WBC # BLD AUTO: 6.34 K/UL (ref 3.9–12.7)
WBC #/AREA URNS HPF: 25 /HPF (ref 0–5)

## 2020-01-02 PROCEDURE — 87088 URINE BACTERIA CULTURE: CPT

## 2020-01-02 PROCEDURE — 96365 THER/PROPH/DIAG IV INF INIT: CPT

## 2020-01-02 PROCEDURE — 80053 COMPREHEN METABOLIC PANEL: CPT

## 2020-01-02 PROCEDURE — 63600175 PHARM REV CODE 636 W HCPCS: Performed by: NURSE PRACTITIONER

## 2020-01-02 PROCEDURE — 83605 ASSAY OF LACTIC ACID: CPT

## 2020-01-02 PROCEDURE — 85025 COMPLETE CBC W/AUTO DIFF WBC: CPT

## 2020-01-02 PROCEDURE — 87040 BLOOD CULTURE FOR BACTERIA: CPT | Mod: 59

## 2020-01-02 PROCEDURE — 81000 URINALYSIS NONAUTO W/SCOPE: CPT

## 2020-01-02 PROCEDURE — 81025 URINE PREGNANCY TEST: CPT | Performed by: NURSE PRACTITIONER

## 2020-01-02 PROCEDURE — 99284 EMERGENCY DEPT VISIT MOD MDM: CPT | Mod: 25

## 2020-01-02 PROCEDURE — 87186 SC STD MICRODIL/AGAR DIL: CPT

## 2020-01-02 PROCEDURE — 87077 CULTURE AEROBIC IDENTIFY: CPT

## 2020-01-02 PROCEDURE — 96375 TX/PRO/DX INJ NEW DRUG ADDON: CPT

## 2020-01-02 PROCEDURE — 87086 URINE CULTURE/COLONY COUNT: CPT

## 2020-01-02 RX ORDER — CEPHALEXIN 500 MG/1
500 CAPSULE ORAL EVERY 12 HOURS
Qty: 14 CAPSULE | Refills: 0 | Status: SHIPPED | OUTPATIENT
Start: 2020-01-02 | End: 2020-01-09

## 2020-01-02 RX ORDER — KETOROLAC TROMETHAMINE 30 MG/ML
15 INJECTION, SOLUTION INTRAMUSCULAR; INTRAVENOUS
Status: COMPLETED | OUTPATIENT
Start: 2020-01-02 | End: 2020-01-02

## 2020-01-02 RX ORDER — PHENAZOPYRIDINE HYDROCHLORIDE 200 MG/1
200 TABLET, FILM COATED ORAL 3 TIMES DAILY PRN
Qty: 6 TABLET | Refills: 0 | Status: SHIPPED | OUTPATIENT
Start: 2020-01-02 | End: 2020-01-12

## 2020-01-02 RX ADMIN — CEFTRIAXONE 1 G: 1 INJECTION, SOLUTION INTRAVENOUS at 10:01

## 2020-01-02 RX ADMIN — KETOROLAC TROMETHAMINE 15 MG: 30 INJECTION, SOLUTION INTRAMUSCULAR at 10:01

## 2020-01-03 VITALS
DIASTOLIC BLOOD PRESSURE: 90 MMHG | SYSTOLIC BLOOD PRESSURE: 140 MMHG | OXYGEN SATURATION: 98 % | HEART RATE: 78 BPM | WEIGHT: 197 LBS | BODY MASS INDEX: 31.66 KG/M2 | TEMPERATURE: 99 F | RESPIRATION RATE: 18 BRPM | HEIGHT: 66 IN

## 2020-01-03 NOTE — ED PROVIDER NOTES
Encounter Date: 2020    This is a Provider in Triage/MSE of a 34 y.o. female presenting to the ED with c/o back/flank pain on left side with urinary symptoms. Care will be transferred to an alternate provider when patient is roomed for a full evaluation and final disposition. PRAMOD Stone, MIHAIP-HANNA 2020 9:05 PM    SCRIBE #1 NOTE: I, Maryellen Leyva, am scribing for, and in the presence of,  José Alonso DNP. I have scribed the following portions of the note - Other sections scribed: HPI, ROS.       History     Chief Complaint   Patient presents with    Flank Pain     pt c/o (L) sided flank pain that started around 5 pm today; took Aleve around 7:30 with no relief; denies any injury or trauma; + urinary frequency, dribbling, and dysuria    Dysuria     This is a 34 y.o. female with a PMHx of HTN and Angioedema who presents to the Emergency Department with a cc of constant left-sided flank pain x5 hours. Pt reports associated dysuria. Pt denies any vaginal discharge or vaginal bleeding. The pt states that she took Aleve with no relief. The pt has no known allergies.      The history is provided by the patient. No  was used.     Review of patient's allergies indicates:  No Known Allergies  Past Medical History:   Diagnosis Date    Allergy     Angio-edema     Hypertension     resolved with weight loss     Past Surgical History:   Procedure Laterality Date     SECTION       Family History   Problem Relation Age of Onset    Hypertension Mother     Diabetes Mother     Heart disease Mother 32        MI then CAD    Hypertension Father     Hypertension Sister     Diabetes Brother      Social History     Tobacco Use    Smoking status: Former Smoker     Packs/day: 0.50     Types: Cigars    Smokeless tobacco: Never Used   Substance Use Topics    Alcohol use: Not Currently    Drug use: No     Review of Systems   Constitutional: Negative for chills, fatigue and fever.   HENT:  Negative for congestion, ear discharge, ear pain, postnasal drip, rhinorrhea, sinus pressure, sneezing, sore throat and voice change.    Eyes: Negative for discharge, itching and visual disturbance.   Respiratory: Negative for cough, shortness of breath and wheezing.    Cardiovascular: Negative for chest pain, palpitations and leg swelling.   Gastrointestinal: Negative for abdominal pain, constipation, diarrhea, nausea and vomiting.   Endocrine: Negative for polydipsia, polyphagia and polyuria.   Genitourinary: Positive for dysuria and flank pain. Negative for frequency, hematuria, urgency, vaginal bleeding, vaginal discharge and vaginal pain.   Musculoskeletal: Negative for arthralgias, back pain and myalgias.   Skin: Negative for rash and wound.   Neurological: Negative for dizziness, seizures, syncope, weakness, numbness and headaches.   Hematological: Negative for adenopathy. Does not bruise/bleed easily.   Psychiatric/Behavioral: Negative for self-injury and suicidal ideas. The patient is not nervous/anxious.        Physical Exam     Initial Vitals [01/02/20 2107]   BP Pulse Resp Temp SpO2   (!) 169/106 79 20 98.9 °F (37.2 °C) 100 %      MAP       --         Physical Exam    Nursing note and vitals reviewed.  Constitutional: She appears well-developed and well-nourished.   HENT:   Head: Normocephalic and atraumatic.   Right Ear: External ear normal.   Left Ear: External ear normal.   Nose: Nose normal.   Eyes: Conjunctivae and EOM are normal. Pupils are equal, round, and reactive to light. Right eye exhibits no discharge. Left eye exhibits no discharge.   Neck: Normal range of motion.   Abdominal: Soft. Normal appearance and bowel sounds are normal. She exhibits no distension. There is no tenderness. There is CVA tenderness (bilaterally).   Musculoskeletal: Normal range of motion.   Neurological: She is alert and oriented to person, place, and time.   Skin: Skin is dry. Capillary refill takes less than 2  seconds.         ED Course   Procedures  Labs Reviewed   URINALYSIS, REFLEX TO URINE CULTURE - Abnormal; Notable for the following components:       Result Value    Occult Blood UA 1+ (*)     Leukocytes, UA 3+ (*)     All other components within normal limits    Narrative:     Preferred Collection Type->Urine, Clean Catch   URINALYSIS MICROSCOPIC - Abnormal; Notable for the following components:    WBC, UA 25 (*)     All other components within normal limits    Narrative:     Preferred Collection Type->Urine, Clean Catch   CBC W/ AUTO DIFFERENTIAL - Abnormal; Notable for the following components:    Hemoglobin 11.7 (*)     Hematocrit 35.4 (*)     All other components within normal limits   COMPREHENSIVE METABOLIC PANEL - Abnormal; Notable for the following components:    Calcium 7.6 (*)     Alkaline Phosphatase 54 (*)     ALT 6 (*)     Anion Gap 6 (*)     All other components within normal limits   CULTURE, URINE    Narrative:     Preferred Collection Type->Urine, Clean Catch   CULTURE, BLOOD   CULTURE, BLOOD   LACTIC ACID, PLASMA   POCT URINE PREGNANCY          Imaging Results          CT Renal Stone Study ABD Pelvis WO (Final result)  Result time 01/02/20 23:00:38    Final result by Sulma Rodriguez MD (01/02/20 23:00:38)                 Impression:      No acute intra-abdominal abnormalities identified.  No evidence of stones or obstructive uropathy.      Electronically signed by: Sulma Rodriguez MD  Date:    01/02/2020  Time:    23:00             Narrative:    EXAMINATION:  CT RENAL STONE STUDY ABD PELVIS WO    CLINICAL HISTORY:  Flank pain, stone disease suspected;    TECHNIQUE:  Low dose axial images, sagittal and coronal reformations were obtained from the lung bases to the pubic symphysis.  Contrast was not administered.    COMPARISON:  None    FINDINGS:  The visualized portion of the heart is unremarkable.  The lung bases are clear.    No significant hepatic abnormality seen on this noncontrast exam.  There  is no intra-or extrahepatic biliary ductal dilatation.  The gallbladder is unremarkable.  The stomach, pancreas, spleen, and adrenal glands are unremarkable.    Kidneys show no evidence of stones or hydronephrosis. Ureters are difficult to track, however no stones are seen along their expected courses.  Urinary bladder is nondistended.  Uterus is retroverted.  Suspected bilateral tubal ligation clips are seen.  No definite adnexal abnormalities are appreciated.    Appendix is visualized and is unremarkable.  The visualized loops of small and large bowel show no evidence of obstruction or inflammation.  No free air or free fluid.    Aorta tapers normally.    No acute osseous abnormality identified. Subcutaneous soft tissue structures are unremarkable.                                 Medical Decision Making:   Initial Assessment:   34-year-old female with bilateral flank pain, dysuria  Differential Diagnosis:   Pyloric, UTI, STD, nephrolithiasis  ED Management:  Initial orders include CBC, chemistry, blood cultures x2, lactic acid, urine pregnancy, urinalysis, urine culture, CT renal stone, saline lock IV, Toradol 50 mg IM, Rocephin 1 g IV            Scribe Attestation:   Scribe #1: I performed the above scribed service and the documentation accurately describes the services I performed. I attest to the accuracy of the note.            ED Course as of Jan 04 0819   Thu Jan 02, 2020 2204 Pos uti, will treat and culture.   Urinalysis, Reflex to Urine Culture Urine, Clean Catch(!) [VC]   2312 CBC: leukocyte count was normal, the H&H was reduced. The platelet count was normal. This indicates mild anemia.       CBC auto differential(!) [VC]   2313 No acute intra-abdominal abnormalities identified.  No evidence of stones or obstructive uropathy.     CT Renal Stone Study ABD Pelvis WO [VC]   2313 Pending at time of disposition (x2).     Blood culture #2 **CANNOT BE ORDERED STAT** [VC]   2313 Pending at time of disposition.    Urine culture [VC]   2319 The chemistry was essentially negative for hypo-or hyper natremia, kalemia, chloridemia, or other electrolyte abnormalities; BUN and creatinine were within normal limits indicating normal kidney function, ALT and AST were within normal limits indicating normal liver function, there was no transaminitis.       Comprehensive metabolic panel(!) [VC]   2351 Lactate, Saul: 0.6 [VC]      ED Course User Index  [VC] oJsé Tamayo DNP     Patient is discharged home in good condition with a prescription for Keflex 500 mg p.o. b.i.d., Pyridium 200 mg p.o. t.i.d..  Return for any worsening or changes in condition otherwise follow up with internal medicine referral provided. Symptomatic therapies and return precautions on AVS.   Medication choices were made after reviewing allergies, medications, history, available laboratories.            Clinical Impression:       ICD-10-CM ICD-9-CM   1. Pyelonephritis N12 590.80         Disposition:   Disposition: Discharged  Condition: Stable    Scribe attestation: MADINA YEPEZ DNP ACNP-BC FNP-C , personally performed the services described in this documentation. All medical record entries made by the scribe were at my direction and in my presence.  I have reviewed the chart and agree that the record reflects my personal performance and is accurate and complete.                   José Tamayo DNP  01/04/20 0895

## 2020-01-05 LAB — BACTERIA UR CULT: ABNORMAL

## 2020-01-07 LAB
BACTERIA BLD CULT: NORMAL
BACTERIA BLD CULT: NORMAL

## 2020-08-14 DIAGNOSIS — Z11.59 NEED FOR HEPATITIS C SCREENING TEST: ICD-10-CM

## 2020-11-27 ENCOUNTER — LAB VISIT (OUTPATIENT)
Dept: LAB | Facility: OTHER | Age: 35
End: 2020-11-27
Payer: OTHER GOVERNMENT

## 2020-11-27 DIAGNOSIS — Z03.818 ENCOUNTER FOR OBSERVATION FOR SUSPECTED EXPOSURE TO OTHER BIOLOGICAL AGENTS RULED OUT: ICD-10-CM

## 2020-11-27 PROCEDURE — U0003 INFECTIOUS AGENT DETECTION BY NUCLEIC ACID (DNA OR RNA); SEVERE ACUTE RESPIRATORY SYNDROME CORONAVIRUS 2 (SARS-COV-2) (CORONAVIRUS DISEASE [COVID-19]), AMPLIFIED PROBE TECHNIQUE, MAKING USE OF HIGH THROUGHPUT TECHNOLOGIES AS DESCRIBED BY CMS-2020-01-R: HCPCS

## 2020-11-29 LAB — SARS-COV-2 RNA RESP QL NAA+PROBE: NOT DETECTED

## 2021-04-15 ENCOUNTER — PATIENT MESSAGE (OUTPATIENT)
Dept: RESEARCH | Facility: HOSPITAL | Age: 36
End: 2021-04-15

## 2021-10-04 ENCOUNTER — PATIENT MESSAGE (OUTPATIENT)
Dept: ADMINISTRATIVE | Facility: HOSPITAL | Age: 36
End: 2021-10-04

## 2022-01-16 ENCOUNTER — HOSPITAL ENCOUNTER (EMERGENCY)
Facility: HOSPITAL | Age: 37
Discharge: HOME | End: 2022-01-16
Attending: EMERGENCY MEDICINE
Payer: COMMERCIAL

## 2022-01-16 VITALS
TEMPERATURE: 99 F | SYSTOLIC BLOOD PRESSURE: 155 MMHG | RESPIRATION RATE: 19 BRPM | BODY MASS INDEX: 37.93 KG/M2 | DIASTOLIC BLOOD PRESSURE: 107 MMHG | WEIGHT: 236 LBS | HEART RATE: 75 BPM | OXYGEN SATURATION: 96 % | HEIGHT: 66 IN

## 2022-01-16 DIAGNOSIS — R07.9 CHEST PAIN: ICD-10-CM

## 2022-01-16 DIAGNOSIS — H81.399 PERIPHERAL VERTIGO, UNSPECIFIED LATERALITY: Primary | ICD-10-CM

## 2022-01-16 LAB
ALBUMIN SERPL BCP-MCNC: 4 G/DL (ref 3.5–5.2)
ALP SERPL-CCNC: 51 U/L (ref 55–135)
ALT SERPL W/O P-5'-P-CCNC: 8 U/L (ref 10–44)
ANION GAP SERPL CALC-SCNC: 13 MMOL/L (ref 8–16)
AST SERPL-CCNC: 15 U/L (ref 10–40)
B-HCG UR QL: NEGATIVE
BASOPHILS # BLD AUTO: 0.01 K/UL (ref 0–0.2)
BASOPHILS NFR BLD: 0.2 % (ref 0–1.9)
BILIRUB SERPL-MCNC: 0.8 MG/DL (ref 0.1–1)
BNP SERPL-MCNC: <10 PG/ML (ref 0–99)
BUN SERPL-MCNC: 11 MG/DL (ref 6–20)
CALCIUM SERPL-MCNC: 9.3 MG/DL (ref 8.7–10.5)
CHLORIDE SERPL-SCNC: 101 MMOL/L (ref 95–110)
CO2 SERPL-SCNC: 23 MMOL/L (ref 23–29)
CREAT SERPL-MCNC: 0.9 MG/DL (ref 0.5–1.4)
CTP QC/QA: YES
CTP QC/QA: YES
DIFFERENTIAL METHOD: ABNORMAL
EOSINOPHIL # BLD AUTO: 0.1 K/UL (ref 0–0.5)
EOSINOPHIL NFR BLD: 1.3 % (ref 0–8)
ERYTHROCYTE [DISTWIDTH] IN BLOOD BY AUTOMATED COUNT: 12.6 % (ref 11.5–14.5)
EST. GFR  (AFRICAN AMERICAN): >60 ML/MIN/1.73 M^2
EST. GFR  (NON AFRICAN AMERICAN): >60 ML/MIN/1.73 M^2
GLUCOSE SERPL-MCNC: 101 MG/DL (ref 70–110)
HCT VFR BLD AUTO: 34.6 % (ref 37–48.5)
HGB BLD-MCNC: 11.7 G/DL (ref 12–16)
IMM GRANULOCYTES # BLD AUTO: 0.01 K/UL (ref 0–0.04)
IMM GRANULOCYTES NFR BLD AUTO: 0.2 % (ref 0–0.5)
LYMPHOCYTES # BLD AUTO: 1.3 K/UL (ref 1–4.8)
LYMPHOCYTES NFR BLD: 23 % (ref 18–48)
MCH RBC QN AUTO: 28.8 PG (ref 27–31)
MCHC RBC AUTO-ENTMCNC: 33.8 G/DL (ref 32–36)
MCV RBC AUTO: 85 FL (ref 82–98)
MONOCYTES # BLD AUTO: 0.3 K/UL (ref 0.3–1)
MONOCYTES NFR BLD: 5.3 % (ref 4–15)
NEUTROPHILS # BLD AUTO: 3.8 K/UL (ref 1.8–7.7)
NEUTROPHILS NFR BLD: 70 % (ref 38–73)
NRBC BLD-RTO: 0 /100 WBC
PLATELET # BLD AUTO: 256 K/UL (ref 150–450)
PMV BLD AUTO: 9.4 FL (ref 9.2–12.9)
POTASSIUM SERPL-SCNC: 3.4 MMOL/L (ref 3.5–5.1)
PROT SERPL-MCNC: 7.6 G/DL (ref 6–8.4)
RBC # BLD AUTO: 4.06 M/UL (ref 4–5.4)
SARS-COV-2 RDRP RESP QL NAA+PROBE: NEGATIVE
SODIUM SERPL-SCNC: 137 MMOL/L (ref 136–145)
TROPONIN I SERPL DL<=0.01 NG/ML-MCNC: <0.006 NG/ML (ref 0–0.03)
TROPONIN I SERPL DL<=0.01 NG/ML-MCNC: <0.006 NG/ML (ref 0–0.03)
WBC # BLD AUTO: 5.47 K/UL (ref 3.9–12.7)

## 2022-01-16 PROCEDURE — 93010 ELECTROCARDIOGRAM REPORT: CPT | Mod: ,,, | Performed by: INTERNAL MEDICINE

## 2022-01-16 PROCEDURE — 25000003 PHARM REV CODE 250: Performed by: EMERGENCY MEDICINE

## 2022-01-16 PROCEDURE — 83880 ASSAY OF NATRIURETIC PEPTIDE: CPT | Performed by: EMERGENCY MEDICINE

## 2022-01-16 PROCEDURE — 96361 HYDRATE IV INFUSION ADD-ON: CPT

## 2022-01-16 PROCEDURE — 84484 ASSAY OF TROPONIN QUANT: CPT | Performed by: EMERGENCY MEDICINE

## 2022-01-16 PROCEDURE — 96375 TX/PRO/DX INJ NEW DRUG ADDON: CPT

## 2022-01-16 PROCEDURE — 80053 COMPREHEN METABOLIC PANEL: CPT | Performed by: EMERGENCY MEDICINE

## 2022-01-16 PROCEDURE — 99285 EMERGENCY DEPT VISIT HI MDM: CPT | Mod: 25

## 2022-01-16 PROCEDURE — 85025 COMPLETE CBC W/AUTO DIFF WBC: CPT | Performed by: EMERGENCY MEDICINE

## 2022-01-16 PROCEDURE — 84484 ASSAY OF TROPONIN QUANT: CPT | Mod: 91 | Performed by: EMERGENCY MEDICINE

## 2022-01-16 PROCEDURE — 63600175 PHARM REV CODE 636 W HCPCS: Performed by: EMERGENCY MEDICINE

## 2022-01-16 PROCEDURE — 81025 URINE PREGNANCY TEST: CPT | Performed by: EMERGENCY MEDICINE

## 2022-01-16 PROCEDURE — 93005 ELECTROCARDIOGRAM TRACING: CPT

## 2022-01-16 PROCEDURE — 93010 EKG 12-LEAD: ICD-10-PCS | Mod: ,,, | Performed by: INTERNAL MEDICINE

## 2022-01-16 PROCEDURE — 96374 THER/PROPH/DIAG INJ IV PUSH: CPT

## 2022-01-16 PROCEDURE — U0002 COVID-19 LAB TEST NON-CDC: HCPCS | Performed by: EMERGENCY MEDICINE

## 2022-01-16 RX ORDER — PROCHLORPERAZINE EDISYLATE 5 MG/ML
10 INJECTION INTRAMUSCULAR; INTRAVENOUS
Status: COMPLETED | OUTPATIENT
Start: 2022-01-16 | End: 2022-01-16

## 2022-01-16 RX ORDER — ASPIRIN 325 MG
325 TABLET ORAL
Status: COMPLETED | OUTPATIENT
Start: 2022-01-16 | End: 2022-01-16

## 2022-01-16 RX ORDER — PROCHLORPERAZINE EDISYLATE 5 MG/ML
10 INJECTION INTRAMUSCULAR; INTRAVENOUS
Status: DISCONTINUED | OUTPATIENT
Start: 2022-01-16 | End: 2022-01-16

## 2022-01-16 RX ORDER — HYDRALAZINE HYDROCHLORIDE 20 MG/ML
10 INJECTION INTRAMUSCULAR; INTRAVENOUS
Status: COMPLETED | OUTPATIENT
Start: 2022-01-16 | End: 2022-01-16

## 2022-01-16 RX ORDER — MECLIZINE HYDROCHLORIDE 25 MG/1
25 TABLET ORAL
Status: COMPLETED | OUTPATIENT
Start: 2022-01-16 | End: 2022-01-16

## 2022-01-16 RX ORDER — ONDANSETRON 4 MG/1
4 TABLET, ORALLY DISINTEGRATING ORAL
Status: DISCONTINUED | OUTPATIENT
Start: 2022-01-16 | End: 2022-01-16 | Stop reason: HOSPADM

## 2022-01-16 RX ORDER — AMLODIPINE BESYLATE 5 MG/1
5 TABLET ORAL DAILY
Qty: 30 TABLET | Refills: 0 | Status: SHIPPED | OUTPATIENT
Start: 2022-01-16 | End: 2024-04-01

## 2022-01-16 RX ORDER — MECLIZINE HYDROCHLORIDE 25 MG/1
25 TABLET ORAL
Status: DISCONTINUED | OUTPATIENT
Start: 2022-01-16 | End: 2022-01-16

## 2022-01-16 RX ORDER — ONDANSETRON 2 MG/ML
4 INJECTION INTRAMUSCULAR; INTRAVENOUS
Status: DISCONTINUED | OUTPATIENT
Start: 2022-01-16 | End: 2022-01-16

## 2022-01-16 RX ORDER — MECLIZINE HYDROCHLORIDE 25 MG/1
25 TABLET ORAL EVERY 8 HOURS PRN
Qty: 14 TABLET | Refills: 0 | Status: SHIPPED | OUTPATIENT
Start: 2022-01-16

## 2022-01-16 RX ORDER — ONDANSETRON 4 MG/1
4 TABLET, ORALLY DISINTEGRATING ORAL
Status: DISCONTINUED | OUTPATIENT
Start: 2022-01-16 | End: 2022-01-16

## 2022-01-16 RX ADMIN — PROCHLORPERAZINE EDISYLATE 10 MG: 5 INJECTION INTRAMUSCULAR; INTRAVENOUS at 04:01

## 2022-01-16 RX ADMIN — ONDANSETRON 4 MG: 2 INJECTION INTRAMUSCULAR; INTRAVENOUS at 03:01

## 2022-01-16 RX ADMIN — ASPIRIN 325 MG ORAL TABLET 325 MG: 325 PILL ORAL at 03:01

## 2022-01-16 RX ADMIN — SODIUM CHLORIDE 500 ML: 0.9 INJECTION, SOLUTION INTRAVENOUS at 04:01

## 2022-01-16 RX ADMIN — MECLIZINE HYDROCHLORIDE 25 MG: 25 TABLET ORAL at 03:01

## 2022-01-16 RX ADMIN — HYDRALAZINE HYDROCHLORIDE 10 MG: 20 INJECTION INTRAMUSCULAR; INTRAVENOUS at 02:01

## 2022-01-16 NOTE — ED NOTES
RN accompanied pt to restroom, pt c/o dizziness when standing. Pt was assisted back to stretcher, placed on cardiac monitor.

## 2022-01-16 NOTE — PROGRESS NOTES
I accepted hand off from Dr. Badillo for follow-up of pending labs and further evaluation and management.  Patient was unable to tolerate a HINTS exam secondary to her vertiginous nausea.  I ordered some Compazine as well as an MRI.  Fortunately MRI is negative.  On re-evaluation, patient reports resolution of all of her symptoms and states her blood pressure is back to its baseline.  I have ordered a 2nd troponin to ensure her chest pain was not cardiac in origin.  Patient will be handed off to Dr. Jay for f/u of repeat trop and likely discharge. I offered admission for observation to the patient, but assuming her labs are WNL and she continues to be asymptomatic, she prefers discharge. I will prepare prescriptions for meclizine and amlodipine for the patient to begin taking, with instructions to follow up promptly with primary care physician, home management techniques, strict return precautions.    Imaging Results          MRI Brain Without Contrast (Final result)  Result time 01/16/22 05:07:58    Final result by Cruz Arias MD (01/16/22 05:07:58)                 Impression:      There is no evidence for acute intracranial process.      Electronically signed by: Cruz Arias  Date:    01/16/2022  Time:    05:07             Narrative:    EXAMINATION:  MRI BRAIN WITHOUT CONTRAST    CLINICAL HISTORY:  Transient ischemic attack (TIA);Dizziness, persistent/recurrent, cardiac or vascular cause suspected;    TECHNIQUE:  Multiplanar multisequence MR imaging of the brain was performed without contrast.    COMPARISON:  CT examination of the brain without contrast January 16, 2022    FINDINGS:  There is mild artifact present.  There is no evidence for intracranial mass, mass effect or midline shift.  There is no hydrocephalus, appropriate CSF spaces are seen at the skull base.  On the FLAIR imaging there is appearance suggesting linear hyperintensity along the white matter of the right frontal lobe.  This is not  definitely seen on the T2 imaging, and may be artifactual, alternatively may relate to an area of mild gliosis or chronic white matter change.  There is no evidence for abnormal diffusion signal hyperintensity to suggest recent or acute ischemia/infarct or other cause for restricted diffusion.  Appropriate flow voids are noted at the skull base and the intracranial venous sinuses appear appropriate.    The visualized orbits appear appropriate, mild paranasal sinus mucosal thickening is noted.  The mastoid air cells demonstrate appropriate signal void.  The upper cervical cord, brainstem and craniocervical junction appear appropriate.                               CT Head Without Contrast (Final result)  Result time 01/16/22 03:07:39    Final result by Cruz Arias MD (01/16/22 03:07:39)                 Impression:      There is no evidence for acute intracranial process.      Electronically signed by: Cruz Arias  Date:    01/16/2022  Time:    03:07             Narrative:    EXAMINATION:  CT HEAD WITHOUT CONTRAST    CLINICAL HISTORY:  Dizziness, persistent/recurrent, cardiac or vascular cause suspected;    TECHNIQUE:  Low dose axial images were obtained through the head.  Coronal and sagittal reformations were also performed. Contrast was not administered.    COMPARISON:  None.    FINDINGS:  The ventricular system, sulcal pattern and parenchymal attenuation characteristics appear appropriate for age.  There is no evidence for intracranial mass, mass effect or midline shift, there is no evidence for acute intracranial hemorrhage.  Appropriate CSF spaces are seen at the skull base.    The visualized orbits appear intact.  The mastoid air cells appear well aerated, minimal paranasal sinus mucosal thickening is noted.  The osseous structures appear intact.                               X-Ray Chest PA And Lateral (Final result)  Result time 01/16/22 03:08:55    Final result by Cruz Arias MD (01/16/22  03:08:55)                 Impression:      There is no radiographic evidence for acute intrathoracic process.      Electronically signed by: Cruz Arias  Date:    01/16/2022  Time:    03:08             Narrative:    EXAMINATION:  XR CHEST PA AND LATERAL    CLINICAL HISTORY:  Chest Pain;    TECHNIQUE:  PA and lateral views of the chest were performed.    COMPARISON:  Chest radiograph June 9, 2018    FINDINGS:  Two views of the chest are submitted.  The cardiomediastinal silhouette appears appropriate.  There is no evidence for confluent infiltrate or consolidation, significant pleural effusion or pneumothorax.  The visualized osseous structures appear intact.

## 2022-01-16 NOTE — ED PROVIDER NOTES
Encounter Date: 2022       History     Chief Complaint   Patient presents with    Chest Pain     Pt presents to ED secondary to chest pain accompanied by dizziness which began 30 minutes ago while sitting. States thought it was indigestion and took maalox with no relief. Midsternal and constant since beginning. Described as tightness and worse with deep inspiration. [facility] PCP was concerned about high bp but did not start patient on meds yet.  MD was giving her 3 months to change diet and activity level before starting meds. Also states DIAZ wanted an ECHO secondary to bilateral leg swelling.      The patient is a 36-year-old female who presents to the emergency department with dizziness and chest pain.  She denies radiation of the chest pain but she has associated nausea. No palpitations. She states she was at work when she started feeling bad.  She has some nausea and states it feels like a tightness to her chest which is increased with deep breaths.  The patient saw her PCP this past week and her blood pressure was high at that time.  Her doctor was going to give her 3 months to get her blood pressure down by losing some weight. The patient weighed 260# and lost weight down to 193#, her blood pressure was controlled at that weight but she has gained back to 236#. Her MD wants her to go get an ECHO.        Review of patient's allergies indicates:  No Known Allergies  Past Medical History:   Diagnosis Date    Allergy     Angio-edema     Hypertension     resolved with weight loss     Past Surgical History:   Procedure Laterality Date     SECTION       Family History   Problem Relation Age of Onset    Hypertension Mother     Diabetes Mother     Heart disease Mother 32        MI then CAD    Hypertension Father     Hypertension Sister     Diabetes Brother      Social History     Tobacco Use    Smoking status: Former Smoker     Packs/day: 0.50     Types: Cigars    Smokeless tobacco: Never  Used   Substance Use Topics    Alcohol use: Not Currently    Drug use: No     Review of Systems   Constitutional: Negative for fever.   HENT: Negative for sore throat.    Respiratory: Positive for chest tightness and shortness of breath.    Cardiovascular: Positive for chest pain and leg swelling. Negative for palpitations.   Gastrointestinal: Positive for nausea. Negative for abdominal pain.   Genitourinary: Negative for dysuria.   Musculoskeletal: Negative for back pain.   Skin: Negative for rash.   Neurological: Positive for dizziness. Negative for weakness, light-headedness and headaches.   Hematological: Does not bruise/bleed easily.   Psychiatric/Behavioral: Negative for confusion.       Physical Exam     Initial Vitals   BP Pulse Resp Temp SpO2   01/16/22 0136 01/16/22 0133 01/16/22 0133 01/16/22 0133 01/16/22 0133   (!) 172/123 90 20 99 °F (37.2 °C) 100 %      MAP       --                Physical Exam    Nursing note and vitals reviewed.  Constitutional: She appears well-developed and well-nourished.   HENT:   Head: Normocephalic and atraumatic.   Eyes: Pupils are equal, round, and reactive to light.   Neck: Neck supple.   Normal range of motion.  Cardiovascular: Normal rate, regular rhythm, normal heart sounds and intact distal pulses. Exam reveals no gallop and no friction rub.    No murmur heard.  Pulmonary/Chest: Breath sounds normal.   Abdominal: Abdomen is soft. There is no abdominal tenderness.   Musculoskeletal:         General: No edema. Normal range of motion.      Cervical back: Normal range of motion and neck supple.     Neurological: She is alert and oriented to person, place, and time.   Skin: Skin is warm and dry.   Psychiatric: She has a normal mood and affect. Her behavior is normal. Judgment and thought content normal.         ED Course   Procedures  Labs Reviewed   CBC W/ AUTO DIFFERENTIAL - Abnormal; Notable for the following components:       Result Value    Hemoglobin 11.7 (*)      Hematocrit 34.6 (*)     All other components within normal limits   COMPREHENSIVE METABOLIC PANEL - Abnormal; Notable for the following components:    Potassium 3.4 (*)     Alkaline Phosphatase 51 (*)     ALT 8 (*)     All other components within normal limits   B-TYPE NATRIURETIC PEPTIDE   TROPONIN I   TROPONIN I   SARS-COV-2 RDRP GENE   POCT URINE PREGNANCY     EKG Readings: (Independently Interpreted)   Initial: 0134. Rhythm: Normal Sinus Rhythm. Heart Rate: 91. Ectopy: No Ectopy. Conduction: Normal. ST Segments: Normal ST Segments. T Waves: Normal. Clinical Impression: Normal Sinus Rhythm       Imaging Results          MRI Brain Without Contrast (Final result)  Result time 01/16/22 05:07:58    Final result by Cruz Arias MD (01/16/22 05:07:58)                 Impression:      There is no evidence for acute intracranial process.      Electronically signed by: Cruz Arias  Date:    01/16/2022  Time:    05:07             Narrative:    EXAMINATION:  MRI BRAIN WITHOUT CONTRAST    CLINICAL HISTORY:  Transient ischemic attack (TIA);Dizziness, persistent/recurrent, cardiac or vascular cause suspected;    TECHNIQUE:  Multiplanar multisequence MR imaging of the brain was performed without contrast.    COMPARISON:  CT examination of the brain without contrast January 16, 2022    FINDINGS:  There is mild artifact present.  There is no evidence for intracranial mass, mass effect or midline shift.  There is no hydrocephalus, appropriate CSF spaces are seen at the skull base.  On the FLAIR imaging there is appearance suggesting linear hyperintensity along the white matter of the right frontal lobe.  This is not definitely seen on the T2 imaging, and may be artifactual, alternatively may relate to an area of mild gliosis or chronic white matter change.  There is no evidence for abnormal diffusion signal hyperintensity to suggest recent or acute ischemia/infarct or other cause for restricted diffusion.  Appropriate  flow voids are noted at the skull base and the intracranial venous sinuses appear appropriate.    The visualized orbits appear appropriate, mild paranasal sinus mucosal thickening is noted.  The mastoid air cells demonstrate appropriate signal void.  The upper cervical cord, brainstem and craniocervical junction appear appropriate.                               CT Head Without Contrast (Final result)  Result time 01/16/22 03:07:39    Final result by Cruz Arias MD (01/16/22 03:07:39)                 Impression:      There is no evidence for acute intracranial process.      Electronically signed by: Cruz Arias  Date:    01/16/2022  Time:    03:07             Narrative:    EXAMINATION:  CT HEAD WITHOUT CONTRAST    CLINICAL HISTORY:  Dizziness, persistent/recurrent, cardiac or vascular cause suspected;    TECHNIQUE:  Low dose axial images were obtained through the head.  Coronal and sagittal reformations were also performed. Contrast was not administered.    COMPARISON:  None.    FINDINGS:  The ventricular system, sulcal pattern and parenchymal attenuation characteristics appear appropriate for age.  There is no evidence for intracranial mass, mass effect or midline shift, there is no evidence for acute intracranial hemorrhage.  Appropriate CSF spaces are seen at the skull base.    The visualized orbits appear intact.  The mastoid air cells appear well aerated, minimal paranasal sinus mucosal thickening is noted.  The osseous structures appear intact.                               X-Ray Chest PA And Lateral (Final result)  Result time 01/16/22 03:08:55    Final result by Cruz Arias MD (01/16/22 03:08:55)                 Impression:      There is no radiographic evidence for acute intrathoracic process.      Electronically signed by: Cruz Arias  Date:    01/16/2022  Time:    03:08             Narrative:    EXAMINATION:  XR CHEST PA AND LATERAL    CLINICAL HISTORY:  Chest Pain;    TECHNIQUE:  PA  and lateral views of the chest were performed.    COMPARISON:  Chest radiograph June 9, 2018    FINDINGS:  Two views of the chest are submitted.  The cardiomediastinal silhouette appears appropriate.  There is no evidence for confluent infiltrate or consolidation, significant pleural effusion or pneumothorax.  The visualized osseous structures appear intact.                                 Medications   hydrALAZINE injection 10 mg (10 mg Intravenous Given 1/16/22 0236)   aspirin tablet 325 mg (325 mg Oral Given 1/16/22 0323)   meclizine tablet 25 mg (25 mg Oral Given 1/16/22 0315)   sodium chloride 0.9% bolus 500 mL (0 mLs Intravenous Stopped 1/16/22 0500)   prochlorperazine injection Soln 10 mg (10 mg Intravenous Given 1/16/22 0407)                          Clinical Impression:   Final diagnoses:  [R07.9] Chest pain  [H81.399] Peripheral vertigo, unspecified laterality (Primary)          ED Disposition Condition    Discharge Stable        ED Prescriptions     Medication Sig Dispense Start Date End Date Auth. Provider    amLODIPine (NORVASC) 5 MG tablet Take 1 tablet (5 mg total) by mouth once daily. 30 tablet 1/16/2022  Rafiq Garg MD    meclizine (ANTIVERT) 25 mg tablet Take 1 tablet (25 mg total) by mouth every 8 (eight) hours as needed for Dizziness. 14 tablet 1/16/2022  Rafiq Garg MD        Follow-up Information     Follow up With Specialties Details Why Contact Info    Thony Smith MD Internal Medicine Schedule an appointment as soon as possible for a visit   [address] [facility] [id number] [phone]             Irma Badillo MD  01/16/22 8339

## 2022-01-16 NOTE — PROGRESS NOTES
Care of patient accepted from Dr. Daniel at 6:00 a.m. shift change awaiting 2nd troponin.  Repeat troponin is 0. Patient is now completely asymptomatic requesting to be discharged.  She will be sent home with a prescription for Antivert as well as Norvasc.  Patient will follow-up with the primary physician but most importantly may return to the ED for any recurrence of dizziness, chest pain or any other urgent concerns.

## 2022-01-16 NOTE — DISCHARGE INSTRUCTIONS
Please call your primary care physician on Monday for a follow-up appointment for further evaluation and management.  Please return with any new or worsening symptoms.

## 2024-04-01 ENCOUNTER — OFFICE VISIT (OUTPATIENT)
Dept: URGENT CARE | Facility: CLINIC | Age: 39
End: 2024-04-01
Payer: COMMERCIAL

## 2024-04-01 VITALS
TEMPERATURE: 100 F | WEIGHT: 207 LBS | DIASTOLIC BLOOD PRESSURE: 84 MMHG | SYSTOLIC BLOOD PRESSURE: 135 MMHG | HEIGHT: 66 IN | HEART RATE: 101 BPM | BODY MASS INDEX: 33.27 KG/M2 | OXYGEN SATURATION: 98 % | RESPIRATION RATE: 18 BRPM

## 2024-04-01 DIAGNOSIS — B34.9 ACUTE VIRAL SYNDROME: Primary | ICD-10-CM

## 2024-04-01 DIAGNOSIS — R50.9 FEVER, UNSPECIFIED FEVER CAUSE: ICD-10-CM

## 2024-04-01 DIAGNOSIS — I10 ESSENTIAL HYPERTENSION: ICD-10-CM

## 2024-04-01 LAB
CTP QC/QA: YES
POC MOLECULAR INFLUENZA A AGN: NEGATIVE
POC MOLECULAR INFLUENZA B AGN: NEGATIVE

## 2024-04-01 PROCEDURE — 99203 OFFICE O/P NEW LOW 30 MIN: CPT | Mod: S$GLB,,, | Performed by: PHYSICIAN ASSISTANT

## 2024-04-01 PROCEDURE — 87502 INFLUENZA DNA AMP PROBE: CPT | Mod: QW,S$GLB,, | Performed by: PHYSICIAN ASSISTANT

## 2024-04-01 RX ORDER — BROMPHENIRAMINE MALEATE, PSEUDOEPHEDRINE HYDROCHLORIDE, AND DEXTROMETHORPHAN HYDROBROMIDE 2; 30; 10 MG/5ML; MG/5ML; MG/5ML
10 SYRUP ORAL EVERY 6 HOURS PRN
Qty: 118 ML | Refills: 0 | Status: SHIPPED | OUTPATIENT
Start: 2024-04-01 | End: 2024-04-11

## 2024-04-01 RX ORDER — HYDROCHLOROTHIAZIDE 12.5 MG/1
12.5 TABLET ORAL DAILY
COMMUNITY

## 2024-04-01 NOTE — LETTER
April 1, 2024      [facility] [place]  [address] [address], [address]  [place] [address]  Phone: [phone]  Fax: [phone]       Patient: Jasmyn Shaikh   YOB: 1985  Date of Visit: 04/01/2024    To Whom It May Concern:    Ana Shaikh  was at [facility] on 04/01/2024. She may return to work/school once fever free for 24 hours without the use of fever reducing medications. If you have any questions or concerns, or if I can be of further assistance, please do not hesitate to contact me.    Sincerely,    Shellie Sierra PA-C

## 2024-04-01 NOTE — PATIENT INSTRUCTIONS
